# Patient Record
Sex: MALE | Race: WHITE | NOT HISPANIC OR LATINO | Employment: OTHER | URBAN - METROPOLITAN AREA
[De-identification: names, ages, dates, MRNs, and addresses within clinical notes are randomized per-mention and may not be internally consistent; named-entity substitution may affect disease eponyms.]

---

## 2020-01-01 ENCOUNTER — LAB (OUTPATIENT)
Dept: ONCOLOGY | Facility: HOSPITAL | Age: 85
End: 2020-01-01

## 2020-01-01 ENCOUNTER — CONSULT (OUTPATIENT)
Dept: ONCOLOGY | Facility: CLINIC | Age: 85
End: 2020-01-01

## 2020-01-01 ENCOUNTER — SPECIALTY PHARMACY (OUTPATIENT)
Dept: ONCOLOGY | Facility: HOSPITAL | Age: 85
End: 2020-01-01

## 2020-01-01 ENCOUNTER — TELEPHONE (OUTPATIENT)
Dept: GENETICS | Facility: HOSPITAL | Age: 85
End: 2020-01-01

## 2020-01-01 ENCOUNTER — HOSPITAL ENCOUNTER (OUTPATIENT)
Dept: ONCOLOGY | Facility: HOSPITAL | Age: 85
Discharge: HOME | End: 2020-12-09

## 2020-01-01 ENCOUNTER — TELEPHONE (OUTPATIENT)
Dept: ONCOLOGY | Facility: CLINIC | Age: 85
End: 2020-01-01

## 2020-01-01 ENCOUNTER — INFUSION (OUTPATIENT)
Dept: ONCOLOGY | Facility: HOSPITAL | Age: 85
End: 2020-01-01

## 2020-01-01 VITALS
WEIGHT: 169 LBS | SYSTOLIC BLOOD PRESSURE: 162 MMHG | HEART RATE: 67 BPM | RESPIRATION RATE: 20 BRPM | DIASTOLIC BLOOD PRESSURE: 74 MMHG | TEMPERATURE: 98.2 F | BODY MASS INDEX: 25.61 KG/M2 | HEIGHT: 68 IN

## 2020-01-01 VITALS
TEMPERATURE: 97.6 F | WEIGHT: 163.7 LBS | BODY MASS INDEX: 24.89 KG/M2 | DIASTOLIC BLOOD PRESSURE: 51 MMHG | HEART RATE: 62 BPM | SYSTOLIC BLOOD PRESSURE: 147 MMHG | RESPIRATION RATE: 18 BRPM

## 2020-01-01 VITALS
BODY MASS INDEX: 24.69 KG/M2 | TEMPERATURE: 97.6 F | WEIGHT: 162.4 LBS | DIASTOLIC BLOOD PRESSURE: 62 MMHG | SYSTOLIC BLOOD PRESSURE: 149 MMHG | HEART RATE: 68 BPM | RESPIRATION RATE: 18 BRPM

## 2020-01-01 VITALS
HEART RATE: 70 BPM | DIASTOLIC BLOOD PRESSURE: 59 MMHG | BODY MASS INDEX: 24.97 KG/M2 | WEIGHT: 164.2 LBS | SYSTOLIC BLOOD PRESSURE: 162 MMHG | TEMPERATURE: 97.8 F

## 2020-01-01 DIAGNOSIS — C61 PROSTATE CANCER (HCC): Primary | ICD-10-CM

## 2020-01-01 DIAGNOSIS — C61 PROSTATE CANCER (HCC): ICD-10-CM

## 2020-01-01 LAB
ALBUMIN SERPL-MCNC: 4.3 G/DL (ref 3.5–5.2)
ALBUMIN SERPL-MCNC: 4.3 G/DL (ref 3.5–5.2)
ALBUMIN SERPL-MCNC: 4.7 G/DL (ref 3.6–4.6)
ALBUMIN/GLOB SERPL: 1.9 G/DL
ALBUMIN/GLOB SERPL: 1.9 G/DL
ALBUMIN/GLOB SERPL: 2.2 {RATIO} (ref 1.2–2.2)
ALP SERPL-CCNC: 80 U/L (ref 39–117)
ALP SERPL-CCNC: 87 IU/L (ref 39–117)
ALP SERPL-CCNC: 94 U/L (ref 39–117)
ALT SERPL-CCNC: 18 IU/L (ref 0–44)
ALT SERPL-CCNC: 19 U/L (ref 1–41)
ALT SERPL-CCNC: 24 U/L (ref 1–41)
AST SERPL-CCNC: 18 U/L (ref 1–40)
AST SERPL-CCNC: 19 IU/L (ref 0–40)
AST SERPL-CCNC: 19 U/L (ref 1–40)
BASOPHILS # BLD AUTO: 0.07 10*3/MM3 (ref 0–0.2)
BASOPHILS # BLD AUTO: 0.07 10*3/MM3 (ref 0–0.2)
BASOPHILS # BLD AUTO: 0.1 X10E3/UL (ref 0–0.2)
BASOPHILS NFR BLD AUTO: 0.5 % (ref 0–1.5)
BASOPHILS NFR BLD AUTO: 0.6 % (ref 0–1.5)
BASOPHILS NFR BLD AUTO: 1 %
BILIRUB SERPL-MCNC: 0.6 MG/DL (ref 0–1.2)
BILIRUB SERPL-MCNC: 0.7 MG/DL (ref 0–1.2)
BILIRUB SERPL-MCNC: 0.9 MG/DL (ref 0–1.2)
BUN SERPL-MCNC: 21 MG/DL (ref 8–27)
BUN SERPL-MCNC: 26 MG/DL (ref 8–23)
BUN SERPL-MCNC: 27 MG/DL (ref 8–23)
BUN/CREAT SERPL: 19 (ref 10–24)
BUN/CREAT SERPL: 20.8 (ref 7–25)
BUN/CREAT SERPL: 25.7 (ref 7–25)
CALCIUM SERPL-MCNC: 8.5 MG/DL (ref 8.6–10.5)
CALCIUM SERPL-MCNC: 9.2 MG/DL (ref 8.6–10.5)
CALCIUM SERPL-MCNC: 9.8 MG/DL (ref 8.6–10.2)
CHLORIDE SERPL-SCNC: 101 MMOL/L (ref 98–107)
CHLORIDE SERPL-SCNC: 104 MMOL/L (ref 96–106)
CHLORIDE SERPL-SCNC: 104 MMOL/L (ref 98–107)
CO2 SERPL-SCNC: 23 MMOL/L (ref 20–29)
CO2 SERPL-SCNC: 23.2 MMOL/L (ref 22–29)
CO2 SERPL-SCNC: 28.1 MMOL/L (ref 22–29)
CREAT SERPL-MCNC: 1.05 MG/DL (ref 0.76–1.27)
CREAT SERPL-MCNC: 1.13 MG/DL (ref 0.76–1.27)
CREAT SERPL-MCNC: 1.25 MG/DL (ref 0.76–1.27)
EOSINOPHIL # BLD AUTO: 0.11 10*3/MM3 (ref 0–0.4)
EOSINOPHIL # BLD AUTO: 0.11 10*3/MM3 (ref 0–0.4)
EOSINOPHIL # BLD AUTO: 0.2 X10E3/UL (ref 0–0.4)
EOSINOPHIL NFR BLD AUTO: 0.8 % (ref 0.3–6.2)
EOSINOPHIL NFR BLD AUTO: 0.9 % (ref 0.3–6.2)
EOSINOPHIL NFR BLD AUTO: 3 %
ERYTHROCYTE [DISTWIDTH] IN BLOOD BY AUTOMATED COUNT: 11.7 % (ref 12.3–15.4)
ERYTHROCYTE [DISTWIDTH] IN BLOOD BY AUTOMATED COUNT: 12 % (ref 11.6–15.4)
ERYTHROCYTE [DISTWIDTH] IN BLOOD BY AUTOMATED COUNT: 12.8 % (ref 12.3–15.4)
GLOBULIN SER CALC-MCNC: 2.1 G/DL (ref 1.5–4.5)
GLOBULIN SER CALC-MCNC: 2.3 GM/DL
GLOBULIN SER CALC-MCNC: 2.3 GM/DL
GLUCOSE SERPL-MCNC: 113 MG/DL (ref 65–99)
GLUCOSE SERPL-MCNC: 64 MG/DL (ref 65–99)
GLUCOSE SERPL-MCNC: 77 MG/DL (ref 65–99)
HCT VFR BLD AUTO: 37.8 % (ref 37.5–51)
HCT VFR BLD AUTO: 38.4 % (ref 37.5–51)
HCT VFR BLD AUTO: 39 % (ref 37.5–51)
HGB BLD-MCNC: 12.7 G/DL (ref 13–17.7)
HGB BLD-MCNC: 12.8 G/DL (ref 13–17.7)
HGB BLD-MCNC: 12.9 G/DL (ref 13–17.7)
IMM GRANULOCYTES # BLD AUTO: 0 X10E3/UL (ref 0–0.1)
IMM GRANULOCYTES # BLD AUTO: 0.02 10*3/MM3 (ref 0–0.05)
IMM GRANULOCYTES # BLD AUTO: 0.09 10*3/MM3 (ref 0–0.05)
IMM GRANULOCYTES NFR BLD AUTO: 0 %
IMM GRANULOCYTES NFR BLD AUTO: 0.2 % (ref 0–0.5)
IMM GRANULOCYTES NFR BLD AUTO: 0.7 % (ref 0–0.5)
LYMPHOCYTES # BLD AUTO: 1.67 10*3/MM3 (ref 0.7–3.1)
LYMPHOCYTES # BLD AUTO: 1.8 X10E3/UL (ref 0.7–3.1)
LYMPHOCYTES # BLD AUTO: 3.04 10*3/MM3 (ref 0.7–3.1)
LYMPHOCYTES NFR BLD AUTO: 12.3 % (ref 19.6–45.3)
LYMPHOCYTES NFR BLD AUTO: 25 %
LYMPHOCYTES NFR BLD AUTO: 25.1 % (ref 19.6–45.3)
MAGNESIUM SERPL-MCNC: 2.1 MG/DL (ref 1.6–2.4)
MCH RBC QN AUTO: 30 PG (ref 26.6–33)
MCH RBC QN AUTO: 30.8 PG (ref 26.6–33)
MCH RBC QN AUTO: 31 PG (ref 26.6–33)
MCHC RBC AUTO-ENTMCNC: 32.6 G/DL (ref 31.5–35.7)
MCHC RBC AUTO-ENTMCNC: 33.3 G/DL (ref 31.5–35.7)
MCHC RBC AUTO-ENTMCNC: 34.1 G/DL (ref 31.5–35.7)
MCV RBC AUTO: 89.9 FL (ref 79–97)
MCV RBC AUTO: 91 FL (ref 79–97)
MCV RBC AUTO: 94.7 FL (ref 79–97)
MONOCYTES # BLD AUTO: 0.7 X10E3/UL (ref 0.1–0.9)
MONOCYTES # BLD AUTO: 0.95 10*3/MM3 (ref 0.1–0.9)
MONOCYTES # BLD AUTO: 1.14 10*3/MM3 (ref 0.1–0.9)
MONOCYTES NFR BLD AUTO: 7.9 % (ref 5–12)
MONOCYTES NFR BLD AUTO: 8.4 % (ref 5–12)
MONOCYTES NFR BLD AUTO: 9 %
NEUTROPHILS # BLD AUTO: 10.54 10*3/MM3 (ref 1.7–7)
NEUTROPHILS # BLD AUTO: 4.2 X10E3/UL (ref 1.4–7)
NEUTROPHILS # BLD AUTO: 7.91 10*3/MM3 (ref 1.7–7)
NEUTROPHILS NFR BLD AUTO: 62 %
NEUTROPHILS NFR BLD AUTO: 65.3 % (ref 42.7–76)
NEUTROPHILS NFR BLD AUTO: 77.3 % (ref 42.7–76)
NRBC BLD AUTO-RTO: 0 /100 WBC (ref 0–0.2)
NRBC BLD AUTO-RTO: 0 /100 WBC (ref 0–0.2)
PHOSPHATE SERPL-MCNC: 2.9 MG/DL (ref 2.5–4.5)
PLATELET # BLD AUTO: 221 X10E3/UL (ref 150–450)
PLATELET # BLD AUTO: 238 10*3/MM3 (ref 140–450)
PLATELET # BLD AUTO: 266 10*3/MM3 (ref 140–450)
POTASSIUM SERPL-SCNC: 3.5 MMOL/L (ref 3.5–5.2)
POTASSIUM SERPL-SCNC: 3.7 MMOL/L (ref 3.5–5.2)
POTASSIUM SERPL-SCNC: 4.2 MMOL/L (ref 3.5–5.2)
PROT SERPL-MCNC: 6.6 G/DL (ref 6–8.5)
PROT SERPL-MCNC: 6.6 G/DL (ref 6–8.5)
PROT SERPL-MCNC: 6.8 G/DL (ref 6–8.5)
PSA SERPL-MCNC: 4.9 NG/ML (ref 0–4)
RBC # BLD AUTO: 4.12 10*6/MM3 (ref 4.14–5.8)
RBC # BLD AUTO: 4.16 X10E6/UL (ref 4.14–5.8)
RBC # BLD AUTO: 4.27 10*6/MM3 (ref 4.14–5.8)
SODIUM SERPL-SCNC: 140 MMOL/L (ref 136–145)
SODIUM SERPL-SCNC: 140 MMOL/L (ref 136–145)
SODIUM SERPL-SCNC: 142 MMOL/L (ref 134–144)
WBC # BLD AUTO: 12.1 10*3/MM3 (ref 3.4–10.8)
WBC # BLD AUTO: 13.62 10*3/MM3 (ref 3.4–10.8)
WBC # BLD AUTO: 7 X10E3/UL (ref 3.4–10.8)

## 2020-01-01 PROCEDURE — 99204 OFFICE O/P NEW MOD 45 MIN: CPT | Performed by: INTERNAL MEDICINE

## 2020-01-01 PROCEDURE — 36415 COLL VENOUS BLD VENIPUNCTURE: CPT

## 2020-01-01 PROCEDURE — 25010000002 ZOLEDRONIC ACID PER 1 MG: Performed by: INTERNAL MEDICINE

## 2020-01-01 PROCEDURE — 96374 THER/PROPH/DIAG INJ IV PUSH: CPT

## 2020-01-01 PROCEDURE — 96365 THER/PROPH/DIAG IV INF INIT: CPT

## 2020-01-01 RX ORDER — LANSOPRAZOLE 15 MG/1
15 CAPSULE, DELAYED RELEASE ORAL DAILY
COMMUNITY

## 2020-01-01 RX ORDER — IRBESARTAN 300 MG/1
TABLET ORAL
COMMUNITY

## 2020-01-01 RX ORDER — BICALUTAMIDE 50 MG/1
TABLET, FILM COATED ORAL
COMMUNITY
Start: 2020-01-01 | End: 2021-01-01 | Stop reason: DRUGHIGH

## 2020-01-01 RX ORDER — EZETIMIBE 10 MG/1
TABLET ORAL
COMMUNITY
Start: 2013-07-15

## 2020-01-01 RX ORDER — CLOPIDOGREL BISULFATE 75 MG/1
TABLET ORAL
COMMUNITY
Start: 2013-07-15

## 2020-01-01 RX ORDER — PANTOPRAZOLE SODIUM 40 MG/1
TABLET, DELAYED RELEASE ORAL
COMMUNITY
Start: 2013-07-15 | End: 2020-01-01

## 2020-01-01 RX ORDER — SODIUM CHLORIDE 9 MG/ML
250 INJECTION, SOLUTION INTRAVENOUS ONCE
Status: CANCELLED | OUTPATIENT
Start: 2020-01-01

## 2020-01-01 RX ORDER — ONDANSETRON HYDROCHLORIDE 8 MG/1
8 TABLET, FILM COATED ORAL 3 TIMES DAILY PRN
Qty: 30 TABLET | Refills: 5 | Status: SHIPPED | OUTPATIENT
Start: 2020-01-01

## 2020-01-01 RX ORDER — PREDNISONE 1 MG/1
5 TABLET ORAL 2 TIMES DAILY
Qty: 60 TABLET | Refills: 11 | Status: SHIPPED | OUTPATIENT
Start: 2020-01-01

## 2020-01-01 RX ORDER — ASPIRIN 81 MG/1
TABLET ORAL
COMMUNITY

## 2020-01-01 RX ORDER — AMLODIPINE BESYLATE 5 MG/1
TABLET ORAL
COMMUNITY
Start: 2020-01-01 | End: 2021-01-01 | Stop reason: DRUGHIGH

## 2020-01-01 RX ORDER — DIPHENHYDRAMINE HCL 25 MG
25 TABLET ORAL EVERY 6 HOURS PRN
COMMUNITY

## 2020-01-01 RX ORDER — ATORVASTATIN CALCIUM 80 MG/1
TABLET, FILM COATED ORAL
COMMUNITY
Start: 2013-07-15

## 2020-01-01 RX ORDER — IBUPROFEN 200 MG
TABLET ORAL
COMMUNITY

## 2020-01-01 RX ORDER — ABIRATERONE ACETATE 250 MG/1
1000 TABLET ORAL DAILY
Qty: 120 TABLET | Refills: 3 | Status: SHIPPED | OUTPATIENT
Start: 2020-01-01 | End: 2021-01-01 | Stop reason: SDUPTHER

## 2020-01-01 RX ORDER — SODIUM CHLORIDE 9 MG/ML
250 INJECTION, SOLUTION INTRAVENOUS ONCE
Status: COMPLETED | OUTPATIENT
Start: 2020-01-01 | End: 2020-01-01

## 2020-01-01 RX ADMIN — ZOLEDRONIC ACID 4 MG: 4 INJECTION, SOLUTION, CONCENTRATE INTRAVENOUS at 09:25

## 2020-01-01 RX ADMIN — SODIUM CHLORIDE 250 ML: 9 INJECTION, SOLUTION INTRAVENOUS at 09:25

## 2020-12-01 PROBLEM — C61 PROSTATE CANCER (HCC): Status: ACTIVE | Noted: 2020-01-01

## 2020-12-01 NOTE — PROGRESS NOTES
Oral Chemotherapy Teaching      Patient Name/:  Toni Gorman   1935  Oral Chemotherapy Regimen: Abiraterone 1000mg PO daily + Prednisone 5mg PO BID + Lupron + Zometa  Date Started Medication: Cycle 1: as soon as possible    Additional Notes:  Oral chemotherapy clinic received referral from Dr. Ramey regarding the initiation of abiraterone. Reviewed patient chart. Released script for abiraterone 1000mg PO daily, #120 with 3 RF to [facility] to begin processing. Pt scheduled for oral chemotherapy education and financial navigation appt on 20.  Will obtain consent and CCA at that time.

## 2020-12-01 NOTE — PROGRESS NOTES
CHIEF COMPLAINT: Stage IVb prostate cancer    REASON FOR REFERRAL: Metastatic prostate cancer      RECORDS OBTAINED  Records of the patients history including those obtained from Dr. Bass were reviewed and summarized in detail.    Oncology/Hematology History Overview Note   1.  Stage IVb prostate cancer [place] 9 adenocarcinoma with positive bone scan 2014 on Lupron with undetectable PSA.  With urge incontinence, no improvement with Vesicare Myrbetriq, both Flomax.    2.  Abdominal aortic aneurysm endovascular surgery 2009 Dr. Hong  3.  Coronary artery disease with history of CABG 2001 and stent placement  4.  History of cholecystectomy and appendectomy  5.  Hypertension  6.  Urge incontinence with nocturia without improvement on Flomax, Ditropan, or Myrbetriq      -1/6/2014 TRUS core needle biopsies showed adenocarcinoma right lobe of the prostate [place]arel[place] 5+4 with perineural invasion, 3 out of 6 cores involved with adenocarcinoma poorly differentiated.  Left lobe without malignancy.  PSA 41.7.  Clinical stage T2b.  Not considered to be surgical candidate.  CT and bone scan obtained by Dr. Bass.  Started on Casodex with Lupron to follow to avoid surgery and subsequent Lupron.  -11/12/2020 total body bone scan compared to 1/13/2014 bone scan shows uptake in the thoracic spine medial right clavicle similar to prior studies.  Previously identified right proximal humerus and left 10th rib abnormalities resolved.  CT abdomen pelvis with and without contrast compared to CT 6/23/2015 showed the lung bases clear.  Liver normal.  Scattered coarse granulomas of the spleen.  Kidney stone 5 mm distal right ureter with no hydronephrosis.  Evidence of endovascular aortic repair.  Focal aneurysm right common iliac 3 cm slightly increased compared to 2015.  No clear-cut suspicious metastases on bone windows or of nodes for solid organs.  -PSA went from undetectable to 0.04, 0.17 as of 4/16/2019, then 0.32 as of  10/7/2019 then 0.89 as of 3/24/2020 then 2.26 as of 9/15/2020 with a less than 6-month doubling time.  Per Dr. Bass note, he had bone metastases on bone scan at diagnosis when Lupron was started.  Placed on Casodex in addition daily continuation of Lupron yet with a rise in PSA to 9.96 as of 10/29/2020.  -12/1/2020 initial [facility] medical oncology consultation: With rising PSA, evidence of metastasis on bone scan albeit improved on long interval bone scan compared to prior bone scan 6 years apart and no visceral or kendra metastases, with a PSA over 9 I think it is reasonable with stage IVb disease to treat him, in addition to the Lupron with Dr. Bass, to discontinue Casodex and to give Zytiga with prednisone.  He does not meet criteria for chemotherapeutic intervention given the relatively few bone metastases visible and lack of kendra or visceral involvement.  All patients with high-grade metastatic prostate cancer need genetic testing not just for their sake but for guidance of the family per NCCN guidelines and occasionally, genetic drivers for malignancy can be targets for subsequent treatment.  We could also give him Zometa every 3 months which may reduce the risk of fractures etc. from bone involvement as well as mitigate against osteoporotic fractures with chemical castration.     Prostate cancer (CMS/HCC)   12/1/2020 Initial Diagnosis    Prostate cancer (CMS/HCC)     12/1/2020 Cancer Staged    Staging form: Prostate, AJCC 8th Edition  - Clinical: Stage IVB (cT2b, cN0, cM1b, PSA: 41.7, Grade Group: 5) - Signed by Terrenec Ramey MD on 12/1/2020         HISTORY OF PRESENT ILLNESS:  The patient is a 85 y.o.  male, referred for prostate cancer metastatic to bone.  See above for oncology history    REVIEW OF SYSTEMS:  A 14 point review of systems was performed and is negative except as noted above.    Past Medical History:   Diagnosis Date   • Abdominal aneurysm (CMS/HCC)    • Heart disease    • Heart  murmur    • Hypertension      Past Surgical History:   Procedure Laterality Date   • ABDOMINAL AORTIC ANEURYSM REPAIR  2009   • CHOLECYSTECTOMY     • CORONARY ANGIOPLASTY WITH STENT PLACEMENT  2012   • PROSTATE BIOPSY  2014       Current Outpatient Medications on File Prior to Visit   Medication Sig Dispense Refill   • atorvastatin (LIPITOR) 80 MG tablet atorvastatin 80 mg tablet   Take 1 tablet every day by oral route.     • clopidogrel (Plavix) 75 MG tablet Plavix 75 mg tablet   Take 1 tablet every day by oral route.     • diphenhydrAMINE (BENADRYL) 25 MG tablet Take 25 mg by mouth Every 6 (Six) Hours As Needed for Itching.     • ezetimibe (ZETIA) 10 MG tablet ezetimibe 10 mg tablet   Take 1 tablet every day by oral route at bedtime.     • lansoprazole (PREVACID) 15 MG capsule Take 15 mg by mouth Daily.     • [DISCONTINUED] pantoprazole (PROTONIX) 40 MG EC tablet pantoprazole 40 mg tablet,delayed release   Take 1 tablet every day by oral route.     • amLODIPine (NORVASC) 5 MG tablet      • aspirin (aspirin) 81 MG EC tablet Aspir-81     • bicalutamide (CASODEX) 50 MG chemo tablet      • ibuprofen (ADVIL,MOTRIN) 200 MG tablet ibuprofen 200 mg tablet   Take 1 tablet as needed by oral route.     • irbesartan (AVAPRO) 300 MG tablet irbesartan 300 mg tablet   Take 1 tablet every day by oral route.       No current facility-administered medications on file prior to visit.        No Known Allergies    Social History     Socioeconomic History   • Marital status:      Spouse name: Not on file   • Number of children: Not on file   • Years of education: Not on file   • Highest education level: Not on file   Tobacco Use   • Smoking status: Former Smoker     Packs/day: 0.25     Years: 3.00     Pack years: 0.75     Types: Cigarettes   Substance and Sexual Activity   • Alcohol use: Yes     Alcohol/week: 1.0 standard drinks     Types: 1 Shots of liquor per week   • Drug use: Never       Family History   Problem Relation Age  "of Onset   • Heart disease Mother    • Heart disease Father        PHYSICAL EXAM:    /74   Pulse 67   Temp 98.2 °F (36.8 °C)   Resp 20   Ht 172.7 cm (68\")   Wt 76.7 kg (169 lb)   BMI 25.70 kg/m²     ECOG: (1) Restricted in Physically Strenuous Activity, Ambulatory & Able to Do Work of Light Nature  General: well appearing male in no acute distress  HEENT: sclera anicteric, oropharynx clear  Lymphatics: no cervical, supraclavicular, inguinal, or axillary adenopathy  Cardiovascular: regular rate and rhythm, no murmurs  Neck: Supple; No thyromegaly  Lungs: clear to auscultation bilaterally. No respiratory distress.   Abdomen: soft, nontender, nondistended.  No palpable organomegaly  Extremities: no cyanosis, clubbing, edema, or cords  Skin: no rashes, lesions, bruising, or petechiae  Neuro: Alert and oriented x 4; Moving all extremities.  Psych: No anxiety or depression    No results found for: HGB, HCT, MCV, PLT, WBC, NEUTROABS, LYMPHSABS, MONOSABS, EOSABS, BASOSABS  No results found for: GLUCOSE, BUN, CREATININE, NA, K, CL, CO2, CALCIUM, PROTEINTOT, ALBUMIN, BILITOT, ALKPHOS, AST, ALT        Assessment/Plan     1.  Stage IVb prostate cancer [place] 9 adenocarcinoma with positive bone scan 2014 on Lupron with undetectable PSA.  With urge incontinence, no improvement with Vesicare Myrbetriq, both Flomax.    2.  Abdominal aortic aneurysm endovascular surgery 2009 Dr. Hong  3.  Coronary artery disease with history of CABG 2001 and stent placement  4.  History of cholecystectomy and appendectomy  5.  Hypertension  6.  Urge incontinence with nocturia without improvement on Flomax, Ditropan, or Myrbetriq      -1/6/2014 TRUS core needle biopsies showed adenocarcinoma right lobe of the prostate Karel's 5+4 with perineural invasion, 3 out of 6 cores involved with adenocarcinoma poorly differentiated.  Left lobe without malignancy.  PSA 41.7.  Clinical stage T2b.  Not considered to be surgical candidate.  CT " and bone scan obtained by Dr. Bass.  Started on Casodex with Lupron to follow to avoid surgery and subsequent Lupron.  -11/12/2020 total body bone scan compared to 1/13/2014 bone scan shows uptake in the thoracic spine medial right clavicle similar to prior studies.  Previously identified right proximal humerus and left 10th rib abnormalities resolved.  CT abdomen pelvis with and without contrast compared to CT 6/23/2015 showed the lung bases clear.  Liver normal.  Scattered coarse granulomas of the spleen.  Kidney stone 5 mm distal right ureter with no hydronephrosis.  Evidence of endovascular aortic repair.  Focal aneurysm right common iliac 3 cm slightly increased compared to 2015.  No clear-cut suspicious metastases on bone windows or of nodes for solid organs.  -PSA went from undetectable to 0.04, 0.17 as of 4/16/2019, then 0.32 as of 10/7/2019 then 0.89 as of 3/24/2020 then 2.26 as of 9/15/2020 with a less than 6-month doubling time.  Per Dr. Bass note, he had bone metastases on bone scan at diagnosis when Lupron was started.  Placed on Casodex in addition daily continuation of Lupron yet with a rise in PSA to 9.96 as of 10/29/2020.  -12/1/2020 initial [facility] medical oncology consultation: With rising PSA, evidence of metastasis on bone scan albeit improved on long interval bone scan compared to prior bone scan 6 years apart and no visceral or kendra metastases, with a PSA over 9 I think it is reasonable with stage IVb disease to treat him, in addition to the Lupron with Dr. Bass, to discontinue Casodex and to give Zytiga with prednisone.  He does not meet criteria for chemotherapeutic intervention given the relatively few bone metastases visible and lack of kendra or visceral involvement.  All patients with high-grade metastatic prostate cancer need genetic testing not just for their sake but for guidance of the family per NCCN guidelines and occasionally, genetic drivers for malignancy can be  targets for subsequent treatment.  We could also give him Zometa every 3 months which may reduce the risk of fractures etc. from bone involvement as well as mitigate against osteoporotic fractures with chemical castration.  He has no dental issues that he is aware of that would put him at an increased risk for osteonecrosis of the mandible.  He will get chemo preparation visit with my pharmacy doctorate, Gillian Vora and we will stop the Casodex once the Zytiga and prednisone are started.  He will also get educated on the Zometa will start that on a couple of weeks and do that every 3 months.  I will have him back to my nurse practitioner in a month for labs and to make sure he is tolerating.  Discussed with patient face-to-face 45 minutes greater than 50% spent counseling regarding this plan as outlined above.      Terrence Ramey MD    12/1/2020

## 2020-12-09 NOTE — PLAN OF CARE
"Oral Chemotherapy Teaching      Patient Name/:  Toni Gorman   1935  Oral Chemotherapy Regimen: Abiraterone 250mg tablets: 4 tablets PO daily + Prednisone 5mg tablets: 1 tablet PO BID   Date Started Medication: Planned: 12/10/20     Initial Teaching Follow Up Comments     Safety     Storage instructions (away from children; away from heat/cold, sunlight, or moisture), handling - use of gloves (caregivers), washing hands after touching pills, managing waste     “How are you storing your medications?”, reminders on storage, proper handling (caregivers using gloves, washing hands, away from children, managing waste, etc.), disposal of medication with D/C or dosage change    Patient counseled on hazardous handling and storage precautions. Discussed plan to wash hands after handling. Caregivers to handle with latex gloves. Reviewed safe sex practices. Discussed appropriate management of bodily fluids. Store at room temp, away from pets and children. Pt verbalized understanding.     Adherence      patient and/or caregiver on how to take medication, take with/without food, assess their adherence potential, stress importance of adherence, ways to manage adherence (pill boxes, phone reminders, calendars), what to do if miss a dose   “How are you taking your medication?” “How are you remembering to take your medication?”, “How many doses have you missed?”, determine reasons for non-adherence (not remembering, side effects, etc), ways to improve, overadherence? Remind patient of ways to improve/maintain adherence   Informed patient to take Abiraterone 250mg tablet: 4 tablets PO daily on an empty stomach (2 hours prior to a meal or 1 hour after). After discussion with the patient and his wife it was decided that bedtime would be the best time to take the medication. The patient uses a pill box and does not have any medications in his \"bedtime\" row. Informed patient that there is no issue with putting the " abiraterone into the pill box as long as it was  from his other medications. Patient states that they stay very adherent to medications with the pill box. Informed patient to take prednisone 5mg tablets: 1 tablet PO BID with food. After discussion patient will take the prednisone in the morning with breakfast and in the evening with dinner. Patient informed about how to handle a missed dose and states that they are very good about taking their medications.     Side Effects/Adverse Reactions      patient on potential side effects, s/s, ways to manage, when to call MD/seek help     Determine if patient experiencing side effects, ways to manage  Discussed the role of RBCs, risk of anemia, and s/sx of anemia (including fatigue). Encouraged patient to continue usual physical activity as tolerated.  Discussed the role of platelets and increased risk of bleeding/bruising. Educated patient on ways to prevent/stop bleeding and when to call MD.  Discussed the role of WBCs and increased risk of infection. Instructed patient to notify MD immediately for temperatures >/= 100.4F. Encouraged appropriate hand hygiene and avoidance of sick contacts.  Discussed risk of diarrhea. Reviewed OTC use of loperamide and when to call MD.  Discussed risk of constipation. Reviewed OTC use of docusate, PEG, and senna and when to call MD.   Reviewed the risk of N/V and antiemetics in current regimen (ondansetron as needed). Instructed patient to call if N/V is not controlled.  Discussed the risk of organ toxicities, specifically low blood counts, elevations in LFTs. Notified patient we will be monitoring toxicities via routine labs. Discussed the potential for muscle and joint pain and that they can use APAP as needed to relieve symptoms.      Miscellaneous     Food interactions, DDIs, financial issues Determine if patient started any new medications since being placed on oral chemo (analyze for DDI) No DDI's to discuss. Informed  patient to avoid grapefruit juice while on this therapy and they state that this will not be an issue. Patient was instructed to stop taking Casodex today and begin taking abiraterone tomorrow. Patient stated understanding and wrote down to confirm they remembered. Reinforced to patient that they should not take Casodex and abiraterone at the same time. Patient seems very informed and interested in their therapy.     Additional Notes:  Discussed above material with patient in the education room. All questions and concerns were addressed. Patient was provided with a personalized calendar, written educational materials, and Gillian Vora's card. Instructed patient to call should they have any additional questions

## 2020-12-22 NOTE — PATIENT INSTRUCTIONS
Zoledronic Acid injection (Hypercalcemia, Oncology)  What is this medicine?  ZOLEDRONIC ACID (DENIA le zachn ik AS id) lowers the amount of calcium loss from bone. It is used to treat too much calcium in your blood from cancer. It is also used to prevent complications of cancer that has spread to the bone.  This medicine may be used for other purposes; ask your health care provider or pharmacist if you have questions.  COMMON BRAND NAME(S): Zometa  What should I tell my health care provider before I take this medicine?  They need to know if you have any of these conditions:  · aspirin-sensitive asthma  · cancer, especially if you are receiving medicines used to treat cancer  · dental disease or wear dentures  · infection  · kidney disease  · receiving corticosteroids like dexamethasone or prednisone  · an unusual or allergic reaction to zoledronic acid, other medicines, foods, dyes, or preservatives  · pregnant or trying to get pregnant  · breast-feeding  How should I use this medicine?  This medicine is for infusion into a vein. It is given by a health care professional in a hospital or clinic setting.  Talk to your pediatrician regarding the use of this medicine in children. Special care may be needed.  Overdosage: If you think you have taken too much of this medicine contact a poison control center or emergency room at once.  NOTE: This medicine is only for you. Do not share this medicine with others.  What if I miss a dose?  It is important not to miss your dose. Call your doctor or health care professional if you are unable to keep an appointment.  What may interact with this medicine?  · certain antibiotics given by injection  · NSAIDs, medicines for pain and inflammation, like ibuprofen or naproxen  · some diuretics like bumetanide, furosemide  · teriparatide  · thalidomide  This list may not describe all possible interactions. Give your health care provider a list of all the medicines, herbs, non-prescription  drugs, or dietary supplements you use. Also tell them if you smoke, drink alcohol, or use illegal drugs. Some items may interact with your medicine.  What should I watch for while using this medicine?  Visit your doctor or health care professional for regular checkups. It may be some time before you see the benefit from this medicine. Do not stop taking your medicine unless your doctor tells you to. Your doctor may order blood tests or other tests to see how you are doing.  Women should inform their doctor if they wish to become pregnant or think they might be pregnant. There is a potential for serious side effects to an unborn child. Talk to your health care professional or pharmacist for more information.  You should make sure that you get enough calcium and vitamin D while you are taking this medicine. Discuss the foods you eat and the vitamins you take with your health care professional.  Some people who take this medicine have severe bone, joint, and/or muscle pain. This medicine may also increase your risk for jaw problems or a broken thigh bone. Tell your doctor right away if you have severe pain in your jaw, bones, joints, or muscles. Tell your doctor if you have any pain that does not go away or that gets worse.  Tell your dentist and dental surgeon that you are taking this medicine. You should not have major dental surgery while on this medicine. See your dentist to have a dental exam and fix any dental problems before starting this medicine. Take good care of your teeth while on this medicine. Make sure you see your dentist for regular follow-up appointments.  What side effects may I notice from receiving this medicine?  Side effects that you should report to your doctor or health care professional as soon as possible:  · allergic reactions like skin rash, itching or hives, swelling of the face, lips, or tongue  · anxiety, confusion, or depression  · breathing problems  · changes in vision  · eye  pain  · feeling faint or lightheaded, falls  · jaw pain, especially after dental work  · mouth sores  · muscle cramps, stiffness, or weakness  · redness, blistering, peeling or loosening of the skin, including inside the mouth  · trouble passing urine or change in the amount of urine  Side effects that usually do not require medical attention (report to your doctor or health care professional if they continue or are bothersome):  · bone, joint, or muscle pain  · constipation  · diarrhea  · fever  · hair loss  · irritation at site where injected  · loss of appetite  · nausea, vomiting  · stomach upset  · trouble sleeping  · trouble swallowing  · weak or tired  This list may not describe all possible side effects. Call your doctor for medical advice about side effects. You may report side effects to FDA at 6-021-FDA-6645.  Where should I keep my medicine?  This drug is given in a hospital or clinic and will not be stored at home.  NOTE: This sheet is a summary. It may not cover all possible information. If you have questions about this medicine, talk to your doctor, pharmacist, or health care provider.  © 2020 [employer] (2015-05-16 14:19:39)

## 2020-12-31 NOTE — TELEPHONE ENCOUNTER
PATIENT CALLING TO SPEAK WITH ALEKSANDR ESTRELLA    PATIENT RECEIVED INFO FOR A RICARDO FOR ZOMETA, INFO FROM [facility], ID# [id number], GROUP #[id number], BIN# [id number],  PCN# [id number], PATIENT IS GOING TO RUN OUT OF MEDICATION, NEEDS REFILLS, PT ONLY HAS ENOUGH TO LAST TO 1-7-21, HE IS GOING OUT OF TOWN FROM 1-30-21 TIL 2-13-21 AND 1 REFILL WILL NOT BE ENOUGH, PHARMACY AT THE [facility],PLEASE ADVISE?    PT CALL BACK # [phone]

## 2021-01-01 ENCOUNTER — TELEPHONE (OUTPATIENT)
Dept: ONCOLOGY | Facility: CLINIC | Age: 86
End: 2021-01-01

## 2021-01-01 ENCOUNTER — SPECIALTY PHARMACY (OUTPATIENT)
Dept: ONCOLOGY | Facility: HOSPITAL | Age: 86
End: 2021-01-01

## 2021-01-01 ENCOUNTER — OFFICE VISIT (OUTPATIENT)
Dept: ONCOLOGY | Facility: CLINIC | Age: 86
End: 2021-01-01

## 2021-01-01 ENCOUNTER — OUTSIDE FACILITY SERVICE (OUTPATIENT)
Dept: CARDIOLOGY | Facility: CLINIC | Age: 86
End: 2021-01-01

## 2021-01-01 ENCOUNTER — LAB (OUTPATIENT)
Dept: ONCOLOGY | Facility: HOSPITAL | Age: 86
End: 2021-01-01

## 2021-01-01 ENCOUNTER — INFUSION (OUTPATIENT)
Dept: ONCOLOGY | Facility: HOSPITAL | Age: 86
End: 2021-01-01

## 2021-01-01 ENCOUNTER — TELEMEDICINE (OUTPATIENT)
Dept: ONCOLOGY | Facility: CLINIC | Age: 86
End: 2021-01-01

## 2021-01-01 VITALS
WEIGHT: 167 LBS | BODY MASS INDEX: 25.31 KG/M2 | DIASTOLIC BLOOD PRESSURE: 75 MMHG | RESPIRATION RATE: 20 BRPM | HEART RATE: 75 BPM | HEIGHT: 68 IN | TEMPERATURE: 97.7 F | SYSTOLIC BLOOD PRESSURE: 140 MMHG

## 2021-01-01 VITALS
HEIGHT: 68 IN | HEART RATE: 64 BPM | BODY MASS INDEX: 25.46 KG/M2 | DIASTOLIC BLOOD PRESSURE: 56 MMHG | WEIGHT: 168 LBS | SYSTOLIC BLOOD PRESSURE: 143 MMHG | TEMPERATURE: 97.1 F | RESPIRATION RATE: 18 BRPM

## 2021-01-01 VITALS
TEMPERATURE: 98.8 F | BODY MASS INDEX: 25.3 KG/M2 | HEART RATE: 76 BPM | WEIGHT: 166.4 LBS | RESPIRATION RATE: 18 BRPM | DIASTOLIC BLOOD PRESSURE: 58 MMHG | SYSTOLIC BLOOD PRESSURE: 149 MMHG

## 2021-01-01 VITALS
SYSTOLIC BLOOD PRESSURE: 143 MMHG | WEIGHT: 165 LBS | BODY MASS INDEX: 25.01 KG/M2 | RESPIRATION RATE: 18 BRPM | DIASTOLIC BLOOD PRESSURE: 62 MMHG | HEIGHT: 68 IN | HEART RATE: 64 BPM | TEMPERATURE: 97.9 F

## 2021-01-01 VITALS
TEMPERATURE: 97.7 F | SYSTOLIC BLOOD PRESSURE: 151 MMHG | WEIGHT: 163.2 LBS | RESPIRATION RATE: 18 BRPM | HEART RATE: 83 BPM | BODY MASS INDEX: 24.81 KG/M2 | DIASTOLIC BLOOD PRESSURE: 58 MMHG

## 2021-01-01 DIAGNOSIS — C61 PROSTATE CANCER (HCC): Primary | ICD-10-CM

## 2021-01-01 DIAGNOSIS — C61 PROSTATE CANCER (HCC): ICD-10-CM

## 2021-01-01 DIAGNOSIS — R93.5 ABNORMAL CT OF THE ABDOMEN: ICD-10-CM

## 2021-01-01 DIAGNOSIS — C79.51 BONE METASTASIS: ICD-10-CM

## 2021-01-01 LAB
ALBUMIN SERPL-MCNC: 4.1 G/DL (ref 3.6–4.6)
ALBUMIN SERPL-MCNC: 4.2 G/DL (ref 3.5–5.2)
ALBUMIN SERPL-MCNC: 4.2 G/DL (ref 3.5–5.2)
ALBUMIN/GLOB SERPL: 1.8 {RATIO} (ref 1.2–2.2)
ALBUMIN/GLOB SERPL: 2.1 G/DL
ALBUMIN/GLOB SERPL: 2.5 G/DL
ALP SERPL-CCNC: 60 U/L (ref 39–117)
ALP SERPL-CCNC: 65 U/L (ref 39–117)
ALP SERPL-CCNC: 74 IU/L (ref 39–117)
ALT SERPL-CCNC: 27 U/L (ref 1–41)
ALT SERPL-CCNC: 30 U/L (ref 1–41)
ALT SERPL-CCNC: 38 IU/L (ref 0–44)
AST SERPL-CCNC: 25 U/L (ref 1–40)
AST SERPL-CCNC: 26 U/L (ref 1–40)
AST SERPL-CCNC: 28 IU/L (ref 0–40)
BASOPHILS # BLD AUTO: 0.04 10*3/MM3 (ref 0–0.2)
BASOPHILS # BLD AUTO: 0.06 10*3/MM3 (ref 0–0.2)
BASOPHILS # BLD AUTO: 0.1 X10E3/UL (ref 0–0.2)
BASOPHILS NFR BLD AUTO: 0 %
BASOPHILS NFR BLD AUTO: 0.4 % (ref 0–1.5)
BASOPHILS NFR BLD AUTO: 0.4 % (ref 0–1.5)
BILIRUB SERPL-MCNC: 0.7 MG/DL (ref 0–1.2)
BILIRUB SERPL-MCNC: 0.8 MG/DL (ref 0–1.2)
BILIRUB SERPL-MCNC: 1 MG/DL (ref 0–1.2)
BUN SERPL-MCNC: 22 MG/DL (ref 8–23)
BUN SERPL-MCNC: 25 MG/DL (ref 8–27)
BUN SERPL-MCNC: 31 MG/DL (ref 8–23)
BUN/CREAT SERPL: 21 (ref 10–24)
BUN/CREAT SERPL: 23.4 (ref 7–25)
BUN/CREAT SERPL: 23.5 (ref 7–25)
CALCIUM SERPL-MCNC: 8.8 MG/DL (ref 8.6–10.2)
CALCIUM SERPL-MCNC: 9.1 MG/DL (ref 8.6–10.5)
CALCIUM SERPL-MCNC: 9.8 MG/DL (ref 8.6–10.5)
CHLORIDE SERPL-SCNC: 105 MMOL/L (ref 98–107)
CHLORIDE SERPL-SCNC: 106 MMOL/L (ref 96–106)
CHLORIDE SERPL-SCNC: 107 MMOL/L (ref 98–107)
CO2 SERPL-SCNC: 21 MMOL/L (ref 20–29)
CO2 SERPL-SCNC: 22.9 MMOL/L (ref 22–29)
CO2 SERPL-SCNC: 25.6 MMOL/L (ref 22–29)
CREAT SERPL-MCNC: 0.94 MG/DL (ref 0.76–1.27)
CREAT SERPL-MCNC: 1.17 MG/DL (ref 0.76–1.27)
CREAT SERPL-MCNC: 1.32 MG/DL (ref 0.76–1.27)
EOSINOPHIL # BLD AUTO: 0 X10E3/UL (ref 0–0.4)
EOSINOPHIL # BLD AUTO: 0.04 10*3/MM3 (ref 0–0.4)
EOSINOPHIL # BLD AUTO: 0.06 10*3/MM3 (ref 0–0.4)
EOSINOPHIL NFR BLD AUTO: 0 %
EOSINOPHIL NFR BLD AUTO: 0.4 % (ref 0.3–6.2)
EOSINOPHIL NFR BLD AUTO: 0.4 % (ref 0.3–6.2)
ERYTHROCYTE [DISTWIDTH] IN BLOOD BY AUTOMATED COUNT: 12.5 % (ref 12.3–15.4)
ERYTHROCYTE [DISTWIDTH] IN BLOOD BY AUTOMATED COUNT: 13 % (ref 11.6–15.4)
ERYTHROCYTE [DISTWIDTH] IN BLOOD BY AUTOMATED COUNT: 13.3 % (ref 12.3–15.4)
GLOBULIN SER CALC-MCNC: 1.7 GM/DL
GLOBULIN SER CALC-MCNC: 2 GM/DL
GLOBULIN SER CALC-MCNC: 2.3 G/DL (ref 1.5–4.5)
GLUCOSE SERPL-MCNC: 107 MG/DL (ref 65–99)
GLUCOSE SERPL-MCNC: 94 MG/DL (ref 65–99)
GLUCOSE SERPL-MCNC: 95 MG/DL (ref 65–99)
HCT VFR BLD AUTO: 37.4 % (ref 37.5–51)
HCT VFR BLD AUTO: 38.3 % (ref 37.5–51)
HCT VFR BLD AUTO: 39 % (ref 37.5–51)
HGB BLD-MCNC: 12.6 G/DL (ref 13–17.7)
HGB BLD-MCNC: 12.6 G/DL (ref 13–17.7)
HGB BLD-MCNC: 13 G/DL (ref 13–17.7)
IMM GRANULOCYTES # BLD AUTO: 0 X10E3/UL (ref 0–0.1)
IMM GRANULOCYTES # BLD AUTO: 0.04 10*3/MM3 (ref 0–0.05)
IMM GRANULOCYTES # BLD AUTO: 0.08 10*3/MM3 (ref 0–0.05)
IMM GRANULOCYTES NFR BLD AUTO: 0 %
IMM GRANULOCYTES NFR BLD AUTO: 0.4 % (ref 0–0.5)
IMM GRANULOCYTES NFR BLD AUTO: 0.6 % (ref 0–0.5)
LYMPHOCYTES # BLD AUTO: 1.36 10*3/MM3 (ref 0.7–3.1)
LYMPHOCYTES # BLD AUTO: 1.45 10*3/MM3 (ref 0.7–3.1)
LYMPHOCYTES # BLD AUTO: 1.6 X10E3/UL (ref 0.7–3.1)
LYMPHOCYTES NFR BLD AUTO: 10 % (ref 19.6–45.3)
LYMPHOCYTES NFR BLD AUTO: 12 %
LYMPHOCYTES NFR BLD AUTO: 13.6 % (ref 19.6–45.3)
MCH RBC QN AUTO: 30.7 PG (ref 26.6–33)
MCH RBC QN AUTO: 31.2 PG (ref 26.6–33)
MCH RBC QN AUTO: 31.9 PG (ref 26.6–33)
MCHC RBC AUTO-ENTMCNC: 32.9 G/DL (ref 31.5–35.7)
MCHC RBC AUTO-ENTMCNC: 33.3 G/DL (ref 31.5–35.7)
MCHC RBC AUTO-ENTMCNC: 33.7 G/DL (ref 31.5–35.7)
MCV RBC AUTO: 92.6 FL (ref 79–97)
MCV RBC AUTO: 93 FL (ref 79–97)
MCV RBC AUTO: 95.6 FL (ref 79–97)
MONOCYTES # BLD AUTO: 0.72 10*3/MM3 (ref 0.1–0.9)
MONOCYTES # BLD AUTO: 0.78 10*3/MM3 (ref 0.1–0.9)
MONOCYTES # BLD AUTO: 0.9 X10E3/UL (ref 0.1–0.9)
MONOCYTES NFR BLD AUTO: 5.7 % (ref 5–12)
MONOCYTES NFR BLD AUTO: 6.7 % (ref 5–12)
MONOCYTES NFR BLD AUTO: 7 %
NEUTROPHILS # BLD AUTO: 10.8 X10E3/UL (ref 1.4–7)
NEUTROPHILS # BLD AUTO: 11.23 10*3/MM3 (ref 1.7–7)
NEUTROPHILS # BLD AUTO: 8.41 10*3/MM3 (ref 1.7–7)
NEUTROPHILS NFR BLD AUTO: 78.5 % (ref 42.7–76)
NEUTROPHILS NFR BLD AUTO: 81 %
NEUTROPHILS NFR BLD AUTO: 82.9 % (ref 42.7–76)
NRBC BLD AUTO-RTO: 0 /100 WBC (ref 0–0.2)
NRBC BLD AUTO-RTO: 0 /100 WBC (ref 0–0.2)
PLATELET # BLD AUTO: 216 10*3/MM3 (ref 140–450)
PLATELET # BLD AUTO: 254 10*3/MM3 (ref 140–450)
PLATELET # BLD AUTO: 254 X10E3/UL (ref 150–450)
POTASSIUM SERPL-SCNC: 3.9 MMOL/L (ref 3.5–5.2)
POTASSIUM SERPL-SCNC: 4.2 MMOL/L (ref 3.5–5.2)
POTASSIUM SERPL-SCNC: 4.3 MMOL/L (ref 3.5–5.2)
PROT SERPL-MCNC: 5.9 G/DL (ref 6–8.5)
PROT SERPL-MCNC: 6.2 G/DL (ref 6–8.5)
PROT SERPL-MCNC: 6.4 G/DL (ref 6–8.5)
PSA SERPL-MCNC: 7.08 NG/ML (ref 0–4)
PSA SERPL-MCNC: 8.5 NG/ML (ref 0–4)
PSA SERPL-MCNC: 8.66 NG/ML (ref 0–4)
RBC # BLD AUTO: 4.04 10*6/MM3 (ref 4.14–5.8)
RBC # BLD AUTO: 4.08 10*6/MM3 (ref 4.14–5.8)
RBC # BLD AUTO: 4.1 X10E6/UL (ref 4.14–5.8)
SODIUM SERPL-SCNC: 139 MMOL/L (ref 136–145)
SODIUM SERPL-SCNC: 140 MMOL/L (ref 136–145)
SODIUM SERPL-SCNC: 142 MMOL/L (ref 134–144)
WBC # BLD AUTO: 10.7 10*3/MM3 (ref 3.4–10.8)
WBC # BLD AUTO: 13.5 X10E3/UL (ref 3.4–10.8)
WBC # BLD AUTO: 13.57 10*3/MM3 (ref 3.4–10.8)

## 2021-01-01 PROCEDURE — 36415 COLL VENOUS BLD VENIPUNCTURE: CPT

## 2021-01-01 PROCEDURE — 99215 OFFICE O/P EST HI 40 MIN: CPT | Performed by: INTERNAL MEDICINE

## 2021-01-01 PROCEDURE — 99214 OFFICE O/P EST MOD 30 MIN: CPT | Performed by: NURSE PRACTITIONER

## 2021-01-01 PROCEDURE — 25010000002 ZOLEDRONIC ACID PER 1 MG: Performed by: NURSE PRACTITIONER

## 2021-01-01 PROCEDURE — 99232 SBSQ HOSP IP/OBS MODERATE 35: CPT | Performed by: INTERNAL MEDICINE

## 2021-01-01 PROCEDURE — 96372 THER/PROPH/DIAG INJ SC/IM: CPT

## 2021-01-01 PROCEDURE — 96374 THER/PROPH/DIAG INJ IV PUSH: CPT

## 2021-01-01 RX ORDER — CHOLECALCIFEROL (VITAMIN D3) 25 MCG
1000 CAPSULE ORAL DAILY
COMMUNITY

## 2021-01-01 RX ORDER — SODIUM CHLORIDE 9 MG/ML
250 INJECTION, SOLUTION INTRAVENOUS ONCE
Status: CANCELLED | OUTPATIENT
Start: 2021-01-01

## 2021-01-01 RX ORDER — SODIUM CHLORIDE 9 MG/ML
250 INJECTION, SOLUTION INTRAVENOUS ONCE
Status: CANCELLED | OUTPATIENT
Start: 2021-06-08

## 2021-01-01 RX ORDER — AMLODIPINE BESYLATE 10 MG/1
TABLET ORAL
COMMUNITY
Start: 2021-01-01

## 2021-01-01 RX ORDER — ABIRATERONE ACETATE 250 MG/1
1000 TABLET ORAL DAILY
Qty: 120 TABLET | Refills: 3 | Status: SHIPPED | OUTPATIENT
Start: 2021-01-01

## 2021-01-01 RX ADMIN — ZOLEDRONIC ACID 4 MG: 4 INJECTION, SOLUTION, CONCENTRATE INTRAVENOUS at 14:09

## 2021-01-04 NOTE — PLAN OF CARE
Reached out to Mr. Gorman for a refill assessment and toxicity check on his abiraterone and prednisone. Patient states he has been compliant and has not experienced any new side effects in the past month. He last got labs done on 12/29/20. Requesting refill of abiraterone and prednisone; his last dose is on 1/7/21. Patient would like the refill mailed to him - address confirmed in [facility].

## 2021-01-06 NOTE — PLAN OF CARE
Returned Mr. Gorman' inquiry about chemo excreted through his body fluids. He is planning on going to [place] and wanted to know if it was okay for him to be in the hot tub with other people. I stated that it was okay and it would not be harmful to other people. I also clarified that he can share a bed with his wife and share utensils with other people. He uses a different bathroom in the house than his wife and I said our recommendation is to flush the toilet twice after using and to clean up if the toilet seat gets soiled but his wife can use the same restroom if they would like.

## 2021-01-06 NOTE — PROGRESS NOTES
CHIEF COMPLAINT: Prostate cancer    Problem List:  Oncology/Hematology History Overview Note   1.  Stage IVb prostate cancer [place] 9 adenocarcinoma with positive bone scan 2014 on Lupron with undetectable PSA.  With urge incontinence, no improvement with Vesicare Myrbetriq, both Flomax.    2.  Abdominal aortic aneurysm endovascular surgery 2009 Dr. Hong  3.  Coronary artery disease with history of CABG 2001 and stent placement  4.  History of cholecystectomy and appendectomy  5.  Hypertension  6.  Urge incontinence with nocturia without improvement on Flomax, Ditropan, or Myrbetriq      -1/6/2014 TRUS core needle biopsies showed adenocarcinoma right lobe of the prostate Corona's 5+4 with perineural invasion, 3 out of 6 cores involved with adenocarcinoma poorly differentiated.  Left lobe without malignancy.  PSA 41.7.  Clinical stage T2b.  Not considered to be surgical candidate.  CT and bone scan obtained by Dr. Bass.  Started on Casodex with Lupron to follow to avoid surgery and subsequent Lupron.  -11/12/2020 total body bone scan compared to 1/13/2014 bone scan shows uptake in the thoracic spine medial right clavicle similar to prior studies.  Previously identified right proximal humerus and left 10th rib abnormalities resolved.  CT abdomen pelvis with and without contrast compared to CT 6/23/2015 showed the lung bases clear.  Liver normal.  Scattered coarse granulomas of the spleen.  Kidney stone 5 mm distal right ureter with no hydronephrosis.  Evidence of endovascular aortic repair.  Focal aneurysm right common iliac 3 cm slightly increased compared to 2015.  No clear-cut suspicious metastases on bone windows or of nodes for solid organs.  -PSA went from undetectable to 0.04, 0.17 as of 4/16/2019, then 0.32 as of 10/7/2019 then 0.89 as of 3/24/2020 then 2.26 as of 9/15/2020 with a less than 6-month doubling time.  Per Dr. Bass note, he had bone metastases on bone scan at diagnosis when Lupron  was started.  Placed on Casodex in addition daily continuation of Lupron yet with a rise in PSA to 9.96 as of 10/29/2020.  -12/1/2020 initial [facility] consultation: With rising PSA, evidence of metastasis on bone scan albeit improved on long interval bone scan compared to prior bone scan 6 years apart and no visceral or kendra metastases, with a PSA over 9 I think it is reasonable with stage IVb disease to treat him, in addition to the Lupron with Dr. Bass, to discontinue Casodex and to give Zytiga with prednisone.  He does not meet criteria for chemotherapeutic intervention given the relatively few bone metastases visible and lack of kendra or visceral involvement.  All patients with high-grade metastatic prostate cancer need genetic testing not just for their sake but for guidance of the family per NCCN guidelines and occasionally, genetic drivers for malignancy can be targets for subsequent treatment.  We could also give him Zometa every 3 months which may reduce the risk of fractures etc. from bone involvement as well as mitigate against osteoporotic fractures with chemical castration.     Prostate cancer (CMS/[facility])   12/1/2020 Initial Diagnosis    Prostate cancer (CMS/[facility])     12/1/2020 Cancer Staged    Staging form: Prostate, AJCC 8th Edition  - Clinical: Stage IVB (cT2b, cN0, cM1b, PSA: 41.7, Grade Group: 5) - Signed by Terrence Ramey MD on 12/1/2020 12/2/2020 -  Chemotherapy    OP PROSTATE Abiraterone / PredniSONE     12/22/2020 -  Chemotherapy    OP SUPPORTIVE Zoledronic Acid Q12W         HISTORY OF PRESENT ILLNESS:  The patient is a 85 y.o. male, here for follow up on management of stage IVb prostate cancer.  The patient has been on Zytiga and prednisone for about 1 month now.  He has stopped Casodex.  Continues on Lupron with Dr. Bass, has a follow-up next week.  He reports feeling well, has had no significant side effects that he is aware of from new medications.  He has had a  "few days with intermittent diarrhea.  Planning a trip to [place] later this month.      Past Medical History:   Diagnosis Date   • Abdominal aneurysm (CMS/HCC)    • Heart disease    • Heart murmur    • Hypertension      Past Surgical History:   Procedure Laterality Date   • ABDOMINAL AORTIC ANEURYSM REPAIR  2009   • CHOLECYSTECTOMY     • CORONARY ANGIOPLASTY WITH STENT PLACEMENT  2012   • PROSTATE BIOPSY  2014       No Known Allergies    Family History and Social History reviewed and changed as necessary      REVIEW OF SYSTEM:   Review of Systems   Constitutional: Negative for appetite change, chills, diaphoresis, fatigue, fever and unexpected weight change.   Respiratory: Negative for cough, hemoptysis and shortness of breath.    Cardiovascular: Negative for chest pain, leg swelling and palpitations.   Gastrointestinal: Positive for occasional diarrhea.   Genitourinary: Positive for chronic urinary frequency particularly nocturnal.    Skin: Negative for rash.   Neurological: Negative for dizziness, gait problem, headaches, light-headedness and numbness.    Psychiatric/Behavioral: Negative for depression. The patient is not nervous/anxious.    All other systems reviewed and are negative.       PHYSICAL EXAM    Vitals:    01/06/21 1037   BP: 143/62   Pulse: 64   Resp: 18   Temp: 97.9 °F (36.6 °C)   Weight: 74.8 kg (165 lb)   Height: 172.7 cm (68\")     Vitals:    01/06/21 1037   PainSc: 0-No pain        Constitutional: Appears well-developed and well-nourished. No distress.   ECOG: (0) Fully Active - Able to Carry On All Pre-disease Performance Without Restriction  HENT:   Head: Normocephalic.   Mouth/Throat: Oropharynx is clear and moist.   Eyes: Conjunctivae are normal. Pupils are equal, round, and reactive to light. No scleral icterus.   Neck: Neck supple. No JVD present.   Cardiovascular: Normal rate, regular rhythm and normal heart sounds.    Pulmonary/Chest: Breath sounds normal. No respiratory distress.  "   Musculoskeletal:Exhibits no edema, tenderness or deformity.   Neurological: Alert and oriented to person, place, and time. Exhibits normal muscle tone.   Skin: No ecchymosis, no petechiae and no rash noted. Not diaphoretic. No cyanosis. Nails show no clubbing.   Psychiatric: Normal mood and affect.   Vitals reviewed.  Labs reviewed.    Lab Results   Component Value Date    HGB 12.7 (L) 12/29/2020    HCT 39.0 12/29/2020    MCV 94.7 12/29/2020     12/29/2020    WBC 12.10 (H) 12/29/2020    NEUTROABS 7.91 (H) 12/29/2020    LYMPHSABS 3.04 12/29/2020    MONOSABS 0.95 (H) 12/29/2020    EOSABS 0.11 12/29/2020    BASOSABS 0.07 12/29/2020       Lab Results   Component Value Date    BUN 27 (H) 12/29/2020    CREATININE 1.05 12/29/2020     12/29/2020    K 4.2 12/29/2020     12/29/2020    CO2 23.2 12/29/2020    CALCIUM 8.5 (L) 12/29/2020    ALBUMIN 4.30 12/29/2020    BILITOT 0.9 12/29/2020    ALKPHOS 80 12/29/2020    AST 18 12/29/2020    ALT 24 12/29/2020           ASSESSMENT & PLAN:    1.  Stage IVb prostate cancer  2.  Urge incontinence with nocturia without improvement on Flomax, Ditropan or Myrbetriq  3.  Intermittent diarrhea    Discussion: The patient is doing well, tolerating therapy with Zytiga and prednisone with no significant side effects, he also received his first Zometa (every 12 weeks) on 12/22/2020 without complication.  Occasional diarrhea but manageable and has not required any intervention.  I reviewed labs from 12/29/2020 with CBC and CMP that were unremarkable.  We will continue therapy unchanged with Zytiga and prednisone, also will continue Zometa every 12 weeks, next is due in March.  He receives Lupron with Dr. Bass, he has a follow-up with Dr. Bass next week at which time he states his PSA will be checked.  We will repeat labs again later this month, he then plans a trip to [place].  We will see him back for follow-up when he returns from [place] the week of February 16 and  will plan on repeating labs again at that time along with a follow-up visit.    This is a level 4, Moderate visit with 1 stable chronic illness, review of results from 2 lab results and prescription drug management.    Kelsi Peter, APRN    01/06/2021

## 2021-01-29 NOTE — PLAN OF CARE
Specialty Pharmacy Assessment    Abiraterone (Zytiga)  Dose: 1000 mg  Frequency: Once daily  Indication: Prostate cancer  Follow-Up: Yes  Dispensing pharmacy: [facility]  Refill status: Current  Dispense status:   Side effect assessment: No or Minimal Side Effects  Intervention: No  Compliance: Patient reports taking capsules whole once daily, Patient reports taking around the same time every day  Additional notes: Labs completed 1/28/21. Patient is leaving for [place] tomorrow, informed him we could not mail to an out of state address. Patient is requesting a refill from [facility]. He will plan to  in person today.

## 2021-02-24 NOTE — TELEPHONE ENCOUNTER
Patient called he got his fist vaccine on 2/14/21 and got exposed to COVID on 2/22/21 by his daughter, and he found out today she is positive. He has an appointment with Kelsi on the 25th should he keep this appointment? Please call to discuss.

## 2021-02-24 NOTE — TELEPHONE ENCOUNTER
Notified patient that appt would be switched to a Mychart video visit and remain at the same time.  He verbalized understanding.

## 2021-02-25 PROBLEM — C79.51 BONE METASTASIS: Status: ACTIVE | Noted: 2021-01-01

## 2021-02-25 NOTE — PROGRESS NOTES
This was an audio and video enabled telemedicine encounter via [employer] due to current COVID-19 pandemic and also patient's possible recent exposure to Covid.      CHIEF COMPLAINT: 1.  Recent potential Covid exposure   2.  Prostate cancer    Problem List:  Oncology/Hematology History Overview Note   1.  Stage IVb prostate cancer [place] 9 adenocarcinoma with positive bone scan 2014 on Lupron with undetectable PSA.  With urge incontinence, no improvement with Vesicare Myrbetriq, both Flomax.    2.  Abdominal aortic aneurysm endovascular surgery 2009 Dr. Hong  3.  Coronary artery disease with history of CABG 2001 and stent placement  4.  History of cholecystectomy and appendectomy  5.  Hypertension  6.  Urge incontinence with nocturia without improvement on Flomax, Ditropan, or Myrbetriq      -1/6/2014 TRUS core needle biopsies showed adenocarcinoma right lobe of the prostate [place]inton's 5+4 with perineural invasion, 3 out of 6 cores involved with adenocarcinoma poorly differentiated.  Left lobe without malignancy.  PSA 41.7.  Clinical stage T2b.  Not considered to be surgical candidate.  CT and bone scan obtained by Dr. Bass.  Started on Casodex with Lupron to follow to avoid surgery and subsequent Lupron.  -11/12/2020 total body bone scan compared to 1/13/2014 bone scan shows uptake in the thoracic spine medial right clavicle similar to prior studies.  Previously identified right proximal humerus and left 10th rib abnormalities resolved.  CT abdomen pelvis with and without contrast compared to CT 6/23/2015 showed the lung bases clear.  Liver normal.  Scattered coarse granulomas of the spleen.  Kidney stone 5 mm distal right ureter with no hydronephrosis.  Evidence of endovascular aortic repair.  Focal aneurysm right common iliac 3 cm slightly increased compared to 2015.  No clear-cut suspicious metastases on bone windows or of nodes for solid organs.  -PSA went from undetectable to 0.04, 0.17 as of 4/16/2019,  then 0.32 as of 10/7/2019 then 0.89 as of 3/24/2020 then 2.26 as of 9/15/2020 with a less than 6-month doubling time.  Per Dr. Bass note, he had bone metastases on bone scan at diagnosis when Lupron was started.  Placed on Casodex in addition daily continuation of Lupron yet with a rise in PSA to 9.96 as of 10/29/2020.  -12/1/2020 initial [facility] medical oncology consultation: With rising PSA, evidence of metastasis on bone scan albeit improved on long interval bone scan compared to prior bone scan 6 years apart and no visceral or kendra metastases, with a PSA over 9 I think it is reasonable with stage IVb disease to treat him, in addition to the Lupron with Dr. Bass, to discontinue Casodex and to give Zytiga with prednisone.  He does not meet criteria for chemotherapeutic intervention given the relatively few bone metastases visible and lack of kendra or visceral involvement.  All patients with high-grade metastatic prostate cancer need genetic testing not just for their sake but for guidance of the family per NCCN guidelines and occasionally, genetic drivers for malignancy can be targets for subsequent treatment.  We could also give him Zometa every 3 months which may reduce the risk of fractures etc. from bone involvement as well as mitigate against osteoporotic fractures with chemical castration.    -12/2/2020 began abiraterone and prednisone  -PSA 12/1/2020 24.9  -PSA 1/28/2021 8.5  -PSA 2/23/2021 8.660     Prostate cancer (CMS/[facility])   12/1/2020 Initial Diagnosis    Prostate cancer (CMS/[facility])     12/1/2020 Cancer Staged    Staging form: Prostate, AJCC 8th Edition  - Clinical: Stage IVB (cT2b, cN0, cM1b, PSA: 41.7, Grade Group: 5) - Signed by Terrence Ramey MD on 12/1/2020 12/2/2020 -  Chemotherapy    OP PROSTATE Abiraterone / PredniSONE     12/22/2020 -  Chemotherapy    OP SUPPORTIVE Zoledronic Acid Q12W         HISTORY OF PRESENT ILLNESS:  The patient is a 85 y.o. male, here for follow up on  management of stage IVb prostate cancer.  The patient continues to tolerate therapy with Zytiga and prednisone, he has been on for about 2 months now with no significant side effects.  Continues on Lupron with Dr. Bass.  He had his trip to [place] with no complications.  He was with his daughter last week and reports that she tested positive for Covid a few days later so he potentially had exposure to Covid.  Currently he is asymptomatic, denies any loss of sense of taste or smell, appetite is good.  He has had no fevers, no cough or shortness of breath.  He is quarantined at home to be safe.  He did receive his first Covid vaccine on February 14 and is scheduled for his second vaccination in March.  He reports he had to go to [facility] to get the vaccination.  Continues to have urinary frequency at night, this is a chronic problem, he has tried several medications previously without relief therefore he stopped taking them.    Past Medical History:   Diagnosis Date   • Abdominal aneurysm (CMS/HCC)    • Heart disease    • Heart murmur    • Hypertension      Past Surgical History:   Procedure Laterality Date   • ABDOMINAL AORTIC ANEURYSM REPAIR  2009   • CHOLECYSTECTOMY     • CORONARY ANGIOPLASTY WITH STENT PLACEMENT  2012   • PROSTATE BIOPSY  2014       No Known Allergies    Family History and Social History reviewed and changed as necessary      REVIEW OF SYSTEM:   Positive for urinary frequency at night, chronic.    PHYSICAL EXAM    Constitutional: Appears well-developed and well-nourished. No distress.   ECOG: (0) Fully Active - Able to Carry On All Pre-disease Performance Without Restriction  HENT:   Head: Normocephalic.   Mouth/Throat: Oropharynx is clear and moist.   Eyes: Conjunctivae are normal. Pupils are equal, round, and reactive to light. No scleral icterus.   Neck: Neck supple. No JVD present.   Cardiovascular: Normal rate, regular rhythm and normal heart sounds.    Pulmonary/Chest: Breath  sounds normal. No respiratory distress.    Musculoskeletal:Exhibits no edema, tenderness or deformity.   Neurological: Alert and oriented to person, place, and time. Exhibits normal muscle tone.   Skin: No ecchymosis, no petechiae and no rash noted. Not diaphoretic. No cyanosis. Nails show no clubbing.   Psychiatric: Normal mood and affect.   Vitals reviewed.  Labs reviewed.    Lab Results   Component Value Date    HGB 12.6 (L) 02/23/2021    HCT 37.4 (L) 02/23/2021    MCV 92.6 02/23/2021     02/23/2021    WBC 13.57 (H) 02/23/2021    NEUTROABS 11.23 (H) 02/23/2021    LYMPHSABS 1.36 02/23/2021    MONOSABS 0.78 02/23/2021    EOSABS 0.06 02/23/2021    BASOSABS 0.06 02/23/2021       Lab Results   Component Value Date    BUN 22 02/23/2021    CREATININE 0.94 02/23/2021     02/23/2021    K 4.3 02/23/2021     02/23/2021    CO2 22.9 02/23/2021    CALCIUM 9.1 02/23/2021    ALBUMIN 4.20 02/23/2021    BILITOT 1.0 02/23/2021    ALKPHOS 65 02/23/2021    AST 25 02/23/2021    ALT 27 02/23/2021     PSA 2/23/2021 8.660    ASSESSMENT & PLAN:    1.  Stage IVb prostate cancer  2.  Urge incontinence with nocturia without improvement on Flomax, Ditropan or Myrbetriq  3.  Recent potential exposure to Covid    Discussion: The patient is doing well, tolerating therapy with Zytiga and prednisone with no significant side effects, he also is on Zometa (every 12 weeks), next due in March. I reviewed labs from 2/23/2021 with CBC and CMP that were unremarkable, PSA was up slightly at 8.660.  We will continue therapy unchanged with Zytiga and prednisone, also will continue Zometa every 12 weeks, next is due in March.  He receives Lupron with Dr. Bass.  I am concerned that his PSA is not decreasing on therapy with Zytiga and prednisone.  We will repeat his restaging scans in March with CT chest, abdomen and pelvis and total body bone scan and I have ordered those today along with repeat PSA.  He recently was with his daughter  who tested positive for Covid, states they had dinner together.  He is quarantining at home however he is asymptomatic.  He has received his first Covid vaccine on February 14 and is scheduled for a second vaccine in March.    This is a level 4 moderate [facility] visit with 1 or more chronic illnesses, review of laboratory data, ordering of upcoming labs and restaging CT and bone scans, management of drug therapy requiring intensive monitoring for toxicity.  Total time of visit today was 30 minutes including reviewing laboratory data, preparing for visit, review of records and ordering upcoming labs along with documented in chart.    Kelsi Peter, APRN    02/25/2021

## 2021-03-03 NOTE — PLAN OF CARE
Specialty Pharmacy Assessment    Abiraterone (Zytiga)  Dose: 1000 mg  Frequency: Once daily  Indication: Prostate cancer  Follow-Up: Yes  Dispensing pharmacy: [facility]  Refill status: Current  Dispense status: Mail  Side effect assessment: No or Minimal Side Effects  Intervention: No  Compliance: Patient reports taking capsules whole once daily, Patient reports taking around the same time every day  Additional notes: Contacted Mr. Gorman for refill and toxicity assessment. Patient confirmed adherence to Abiraterone 1000mg (take 4 tablets by mouth once daily) and Prednisone 5mg (take 1 tablet by mouth twice daily). Patient denied experiencing any side effects. Denied nausea, vomiting, constipation, and diarrhea. Patient stated he has plenty of his prednisone at home as he fills it locally but would like to request a refill of Abiraterone 1000mg from [facility] to be delivered to the confirmed address of: [address] [place] [address]. Will notify [facility] pharmacy of refill request. Patient has [employer] insurance but stated he had the medication delivered last fill and would like it delivered to him again for this fill as he has all of his doctor appointments in [place], [place]. Patient had labs drawn on 2/25/2021 and has a doctor appointment on 3/10/2021 in [place]. Answered all of the patients questions and addressed any concerns.        Medication Adherence    Any gaps in refill history greater than 2 weeks in the last 3 months: no  Demonstrates understanding of importance of adherence: yes  Informant: patient  Reliability of informant: reliable  Estimated medication adherence level: %  Reasons for non-adherence: no problems identified        Drug Interactions    Drug interactions evaluated: yes  Clinically relevant drug interactions identified: no  Provided the patient with educational material regarding drug interactions: not applicable        Patient Counseling    Counseled  the patient on the following: doses and administration discussed, safe handling, storage, and disposal discussed, possible adverse effects and management discussed, possible drug and prescription drug interactions discussed, possible drug and OTC drug and food interactions discussed, lab monitoring and follow-up discussed, cost of medications and cost implications discussed, adherence and missed doses discussed, pharmacy contact information discussed        Adverse Effects    Constipation: Neg  Diarrhea: Neg  Nausea: Neg  Vomiting: Neg        Discussed aforementioned material with patient via phone. Answered all of the patients questions and addressed any concerns. Provided the patient with the pharmacists contact information and instructions to call should additional questions arise.     Thank you,    Sarah Noe  PharmD Candidate 2021  3/3/2021  09:57 EST

## 2021-03-11 NOTE — TELEPHONE ENCOUNTER
tt Evelyne - our order only shows with contrast not with and without. Told her possibly another md ordered with out contrast. She will recheck the order and call me back directly if it is not correct.

## 2021-03-11 NOTE — TELEPHONE ENCOUNTER
[facility] is coming this morning to have CT scan. The order is for CT abdomen, pelvis and chest with contrast and without. There radiology is requesting to with contrast only. He think this may be to much radiation for patient. Please call and she can change there order.

## 2021-03-16 NOTE — PROGRESS NOTES
CHIEF COMPLAINT: Follow-up stage IVb prostate cancer    Problem List:  Oncology/Hematology History Overview Note   1.  Stage IVb prostate cancer [place] 9 adenocarcinoma with positive bone scan 2014 on Lupron with undetectable PSA.  With urge incontinence, no improvement with Vesicare Myrbetriq, both Flomax.    2.  Abdominal aortic aneurysm endovascular surgery 2009 Dr. Hong  3.  Coronary artery disease with history of CABG 2001 and stent placement  4.  History of cholecystectomy and appendectomy  5.  Hypertension  6.  Urge incontinence with nocturia without improvement on Flomax, Ditropan, or Myrbetriq      -1/6/2014 TRUS core needle biopsies showed adenocarcinoma right lobe of the prostate [place] 5+4 with perineural invasion, 3 out of 6 cores involved with adenocarcinoma poorly differentiated.  Left lobe without malignancy.  PSA 41.7.  Clinical stage T2b.  Not considered to be surgical candidate.  CT and bone scan obtained by Dr. Bass.  Started on Casodex with Lupron to follow to avoid surgery and subsequent Lupron.  -11/12/2020 total body bone scan compared to 1/13/2014 bone scan shows uptake in the thoracic spine medial right clavicle similar to prior studies.  Previously identified right proximal humerus and left 10th rib abnormalities resolved.  CT abdomen pelvis with and without contrast compared to CT 6/23/2015 showed the lung bases clear.  Liver normal.  Scattered coarse granulomas of the spleen.  Kidney stone 5 mm distal right ureter with no hydronephrosis.  Evidence of endovascular aortic repair.  Focal aneurysm right common iliac 3 cm slightly increased compared to 2015.  No clear-cut suspicious metastases on bone windows or of nodes for solid organs.  -PSA went from undetectable to 0.04, 0.17 as of 4/16/2019, then 0.32 as of 10/7/2019 then 0.89 as of 3/24/2020 then 2.26 as of 9/15/2020 with a less than 6-month doubling time.  Per Dr. Bass note, he had bone metastases on bone scan at  diagnosis when Lupron was started.  Placed on Casodex in addition daily continuation of Lupron yet with a rise in PSA to 9.96 as of 10/29/2020.  -12/1/2020 initial [facility] consultation: With rising PSA, evidence of metastasis on bone scan albeit improved on long interval bone scan compared to prior bone scan 6 years apart and no visceral or kendra metastases, with a PSA over 9 I think it is reasonable with stage IVb disease to treat him, in addition to the Lupron with Dr. Bass, to discontinue Casodex and to give Zytiga with prednisone.  He does not meet criteria for chemotherapeutic intervention given the relatively few bone metastases visible and lack of kendra or visceral involvement.  All patients with high-grade metastatic prostate cancer need genetic testing not just for their sake but for guidance of the family per NCCN guidelines and occasionally, genetic drivers for malignancy can be targets for subsequent treatment.  We could also give him Zometa every 3 months which may reduce the risk of fractures etc. from bone involvement as well as mitigate against osteoporotic fractures with chemical castration.    -12/2/2020 began abiraterone and prednisone  -PSA 12/1/2020 24.9  -PSA 1/28/2021 8.5  -PSA 2/23/2021 8.660    -3/16/2021 [facility] follow-up visit: PSA 9.8. CMP unremarkable with normal calcium phosphate and magnesium. Hemoglobin 12.8. Creatinine 1.5.3/11/2021 CT chest abdomen pelvis with contrast [facility] shows T1 sclerotic bone lesion with no intrapulmonary metastases and abdominal/pelvic disease progression.  Mural thickening involving the cecum which could be neoplastic versus inflammatory for which colonoscopy is recommended.  Stable endovascular repair with aortoiliac stent grafts staying right, iliac aneurysm.  Total body bone scan negative for metastasis.       Prostate cancer (CMS/HCC)   12/1/2020 Initial Diagnosis    Prostate cancer (CMS/HCC)    "  12/1/2020 Cancer Staged    Staging form: Prostate, AJCC 8th Edition  - Clinical: Stage IVB (cT2b, cN0, cM1b, PSA: 41.7, Grade Group: 5) - Signed by Terrence Ramey MD on 12/1/2020 12/2/2020 -  Chemotherapy    OP PROSTATE Abiraterone / PredniSONE     12/22/2020 -  Chemotherapy    OP SUPPORTIVE Zoledronic Acid Q12W     3/11/2021 Imaging    CT chest abdomen pelvis with contrast [facility] shows T1 sclerotic bone lesion with no intrapulmonary metastases and abdominal/pelvic disease progression.  Mural thickening involving the cecum which could be neoplastic versus inflammatory for which colonoscopy is recommended.  Stable endovascular repair with aortoiliac stent grafts staying right, iliac aneurysm.  Total body bone scan negative for metastasis.         HISTORY OF PRESENT ILLNESS:  The patient is a 85 y.o. male, here for follow up on management of stage IVb prostate cancer    Past Medical History:   Diagnosis Date   • Abdominal aneurysm (CMS/HCC)    • Heart disease    • Heart murmur    • Hypertension      Past Surgical History:   Procedure Laterality Date   • ABDOMINAL AORTIC ANEURYSM REPAIR  2009   • CHOLECYSTECTOMY     • CORONARY ANGIOPLASTY WITH STENT PLACEMENT  2012   • PROSTATE BIOPSY  2014       No Known Allergies    Family History and Social History reviewed and changed as necessary    REVIEW OF SYSTEM:   No bone pain    PHYSICAL EXAM:  Looks quite fit. Lungs clear.  Heart regular rate. Neurologically no focal motor or sensory deficits      Vitals:    03/16/21 1329   BP: 140/75   Pulse: 75   Resp: 20   Temp: 97.7 °F (36.5 °C)   Weight: 75.8 kg (167 lb)   Height: 172.7 cm (68\")     Vitals:    03/16/21 1329   PainSc: 0-No pain          ECOG score: 1     Karnofsky score: 90     Vitals reviewed.    ECOG: (1) Restricted in Physically Strenuous Activity, Ambulatory & Able to Do Work of Light Nature    Lab Results   Component Value Date    HGB 12.6 (L) 02/23/2021    HCT 37.4 (L) 02/23/2021    MCV 92.6 " 02/23/2021     02/23/2021    WBC 13.57 (H) 02/23/2021    NEUTROABS 11.23 (H) 02/23/2021    LYMPHSABS 1.36 02/23/2021    MONOSABS 0.78 02/23/2021    EOSABS 0.06 02/23/2021    BASOSABS 0.06 02/23/2021       Lab Results   Component Value Date    BUN 22 02/23/2021    CREATININE 0.94 02/23/2021     02/23/2021    K 4.3 02/23/2021     02/23/2021    CO2 22.9 02/23/2021    CALCIUM 9.1 02/23/2021    ALBUMIN 4.20 02/23/2021    BILITOT 1.0 02/23/2021    ALKPHOS 65 02/23/2021    AST 25 02/23/2021    ALT 27 02/23/2021             ASSESSMENT & PLAN:  1.  Stage IVb prostate cancer [place] 9 adenocarcinoma with positive bone scan 2014 on Lupron with undetectable PSA.  With urge incontinence, no improvement with Vesicare Myrbetriq, both Flomax.    2.  Abdominal aortic aneurysm endovascular surgery 2009 Dr. Hong  3.  Coronary artery disease with history of CABG 2001 and stent placement  4.  History of cholecystectomy and appendectomy  5.  Hypertension  6.  Urge incontinence with nocturia without improvement on Flomax, Ditropan, or Myrbetriq      -1/6/2014 TRUS core needle biopsies showed adenocarcinoma right lobe of the prostate [place]'s 5+4 with perineural invasion, 3 out of 6 cores involved with adenocarcinoma poorly differentiated.  Left lobe without malignancy.  PSA 41.7.  Clinical stage T2b.  Not considered to be surgical candidate.  CT and bone scan obtained by Dr. Bass.  Started on Casodex with Lupron to follow to avoid surgery and subsequent Lupron.  -11/12/2020 total body bone scan compared to 1/13/2014 bone scan shows uptake in the thoracic spine medial right clavicle similar to prior studies.  Previously identified right proximal humerus and left 10th rib abnormalities resolved.  CT abdomen pelvis with and without contrast compared to CT 6/23/2015 showed the lung bases clear.  Liver normal.  Scattered coarse granulomas of the spleen.  Kidney stone 5 mm distal right ureter with no hydronephrosis.   Evidence of endovascular aortic repair.  Focal aneurysm right common iliac 3 cm slightly increased compared to 2015.  No clear-cut suspicious metastases on bone windows or of nodes for solid organs.  -PSA went from undetectable to 0.04, 0.17 as of 4/16/2019, then 0.32 as of 10/7/2019 then 0.89 as of 3/24/2020 then 2.26 as of 9/15/2020 with a less than 6-month doubling time.  Per Dr. Bass note, he had bone metastases on bone scan at diagnosis when Lupron was started.  Placed on Casodex in addition daily continuation of Lupron yet with a rise in PSA to 9.96 as of 10/29/2020.  -12/1/2020 initial [facility] consultation: With rising PSA, evidence of metastasis on bone scan albeit improved on long interval bone scan compared to prior bone scan 6 years apart and no visceral or kendra metastases, with a PSA over 9 I think it is reasonable with stage IVb disease to treat him, in addition to the Lupron with Dr. Bass, to discontinue Casodex and to give Zytiga with prednisone.  He does not meet criteria for chemotherapeutic intervention given the relatively few bone metastases visible and lack of kendra or visceral involvement.  All patients with high-grade metastatic prostate cancer need genetic testing not just for their sake but for guidance of the family per NCCN guidelines and occasionally, genetic drivers for malignancy can be targets for subsequent treatment.  We could also give him Zometa every 3 months which may reduce the risk of fractures etc. from bone involvement as well as mitigate against osteoporotic fractures with chemical castration.    -12/2/2020 began abiraterone and prednisone  -PSA 12/1/2020 24.9  -PSA 1/28/2021 8.5  -PSA 2/23/2021 8.660    -3/16/2021 [facility] follow-up visit: PSA 9.8. CMP unremarkable with normal calcium phosphate and magnesium. Hemoglobin 12.8. Creatinine 1.5.  3/11/2021 CT chest abdomen pelvis with contrast [facility] shows T1 sclerotic  bone lesion with no intrapulmonary metastases and abdominal/pelvic disease progression.  Mural thickening involving the cecum which could be neoplastic versus inflammatory for which colonoscopy is recommended.  Stable endovascular repair with aortoiliac stent grafts staying right, iliac aneurysm.  Total body bone scan negative for metastasis.  Given that his PSA is staying relatively above on Zytiga and prednisone with the single T1 abnormality on CAT scan not well compared given the changes over at [facility] to [facility] but certainly paucimetastatic. Hence we'll continue the Zytiga, prednisone, every 3 months Zometa, and every 3-month Lupron with Dr. Bass and repeat CAT scans and bone scan in 3 months. With the colonic abnormality on CT we'll get him set up for colonoscopy as colitis will be an unusual complication from his cancer therapy. If we have clear-cut progression of his possible metastatic disease on systemic therapies would not be incredibly aggressive at the age of 85. He'll see my nurse practitioner in the interval to make sure his creatinine is doing well on Zometa.    Total time of care including review of images and reports of images at [facility] and interval laboratory since December as well as going over the overall plan, reinforcing the need to get passion for his routine Lupron which due to world-wide Shortage of 6 months dosage form is now on 3-month dosage (and I mentioned the possibility of switching to oral Relugolix should he desire), took a total of 55 minutes of patient care time today inclusive time spent for here, and after visit getting his appointment set communicating with Dr. Bass and going over the ongoing potential toxicities of therapy and outlining his care as outlined.  Terrence Ramey MD    03/16/2021

## 2021-03-26 NOTE — PLAN OF CARE
"Specialty Pharmacy Assessment    Abiraterone (Zytiga)  Dose: 1000 mg  Frequency: Once daily  Indication: Prostate cancer  Follow-Up: Yes  Dispensing pharmacy: [facility]  Refill status: Refills Submitted  Dispense status: Mail  Side effect assessment: No or Minimal Side Effects  Intervention: No  Compliance: Patient reports taking capsules whole once daily, Patient reports taking around the same time every day  Additional notes: Last labs: 3/16       Medication Adherence    Any gaps in refill history greater than 2 weeks in the last 3 months: no  Demonstrates understanding of importance of adherence: yes  Reliability of informant: reliable  Estimated medication adherence level: %  Reasons for non-adherence: no problems identified  Adherence tools used: watch   Other adherence tools: \"when clock says 7am I take my medicine\"        Adverse Effects    *All other systems reviewed and are negative       Drug Interactions    Drug interactions evaluated: yes  Clinically relevant drug interactions identified: no  Provided the patient with educational material regarding drug interactions: not applicable       Patient Counseling    Counseled the patient on the following: doses and administration discussed, possible adverse effects and management discussed, lab monitoring and follow-up discussed, adherence and missed doses discussed, pharmacy contact information discussed       Reached out to patient for refill assessment/toxicity check. Pt confirms compliance to Abiraterone.Reports tolerating treatment with no new concerns reported during encounter. Requesting refill of Abiraterone to be processed at [facility]. Patient states medication has been being mailed, even though we have that we are unable to deliver. Will alert [facility] to prep for dispense and to contact patient on whether we are able to deliver.     Discussed aforementioned material with patient in clinic/over the phone. All questions and concerns addressed. " Provided patient with my contact information and instructions to call should additional questions arise.    Thank you,  Mirna Noble  PharmD Candidate 2021  3/26/2021 11:43 EDT

## 2021-04-14 NOTE — PROGRESS NOTES
CHIEF COMPLAINT:  1.  CT abdomen with cecal thickening   2.  Stage IVb prostate cancer    Problem List:  Oncology/Hematology History Overview Note   1.  Stage IVb prostate cancer Norris 9 adenocarcinoma with positive bone scan 2014 on Lupron with undetectable PSA.  With urge incontinence, no improvement with Vesicare Myrbetriq, both Flomax.    2.  Abdominal aortic aneurysm endovascular surgery 2009 Dr. Hong  3.  Coronary artery disease with history of CABG 2001 and stent placement  4.  History of cholecystectomy and appendectomy  5.  Hypertension  6.  Urge incontinence with nocturia without improvement on Flomax, Ditropan, or Myrbetriq      -1/6/2014 TRUS core needle biopsies showed adenocarcinoma right lobe of the prostate Norris's 5+4 with perineural invasion, 3 out of 6 cores involved with adenocarcinoma poorly differentiated.  Left lobe without malignancy.  PSA 41.7.  Clinical stage T2b.  Not considered to be surgical candidate.  CT and bone scan obtained by Dr. Bass.  Started on Casodex with Lupron to follow to avoid surgery and subsequent Lupron.  -11/12/2020 total body bone scan compared to 1/13/2014 bone scan shows uptake in the thoracic spine medial right clavicle similar to prior studies.  Previously identified right proximal humerus and left 10th rib abnormalities resolved.  CT abdomen pelvis with and without contrast compared to CT 6/23/2015 showed the lung bases clear.  Liver normal.  Scattered coarse granulomas of the spleen.  Kidney stone 5 mm distal right ureter with no hydronephrosis.  Evidence of endovascular aortic repair.  Focal aneurysm right common iliac 3 cm slightly increased compared to 2015.  No clear-cut suspicious metastases on bone windows or of nodes for solid organs.  -PSA went from undetectable to 0.04, 0.17 as of 4/16/2019, then 0.32 as of 10/7/2019 then 0.89 as of 3/24/2020 then 2.26 as of 9/15/2020 with a less than 6-month doubling time.  Per Dr. Bass note, he had  bone metastases on bone scan at diagnosis when Lupron was started.  Placed on Casodex in addition daily continuation of Lupron yet with a rise in PSA to 9.96 as of 10/29/2020.  -12/1/2020 initial [facility] consultation: With rising PSA, evidence of metastasis on bone scan albeit improved on long interval bone scan compared to prior bone scan 6 years apart and no visceral or kendra metastases, with a PSA over 9 I think it is reasonable with stage IVb disease to treat him, in addition to the Lupron with Dr. Bass, to discontinue Casodex and to give Zytiga with prednisone.  He does not meet criteria for chemotherapeutic intervention given the relatively few bone metastases visible and lack of kendra or visceral involvement.  All patients with high-grade metastatic prostate cancer need genetic testing not just for their sake but for guidance of the family per NCCN guidelines and occasionally, genetic drivers for malignancy can be targets for subsequent treatment.  We could also give him Zometa every 3 months which may reduce the risk of fractures etc. from bone involvement as well as mitigate against osteoporotic fractures with chemical castration.    -12/2/2020 began abiraterone and prednisone  -PSA 12/1/2020 24.9  -PSA 1/28/2021 8.5  -PSA 2/23/2021 8.660    -3/16/2021 [facility] follow-up visit: PSA 9.8. CMP unremarkable with normal calcium phosphate and magnesium. Hemoglobin 12.8. Creatinine 1.5.  3/11/2021 CT chest abdomen pelvis with contrast [facility] shows T1 sclerotic bone lesion with no intrapulmonary metastases and abdominal/pelvic disease progression.  Mural thickening involving the cecum which could be neoplastic versus inflammatory for which colonoscopy is recommended.  Stable endovascular repair with aortoiliac stent grafts staying right, iliac aneurysm.  Total body bone scan negative for metastasis.  Given that his PSA is staying relatively above on Zytiga and  prednisone with the single T1 abnormality on CAT scan not well compared given the changes over at [facility] to [facility] but certainly paucimetastatic. Hence we'll continue the Zytiga, prednisone, every 3 months Zometa, and every 3-month Lupron with Dr. Bass and repeat CAT scans and bone scan in 3 months. With the colonic abnormality on CT we'll get him set up for colonoscopy as colitis will be an unusual complication from his cancer therapy. If we have clear-cut progression of his possible metastatic disease on systemic therapies would not be incredibly aggressive at the age of 85. He'll see my nurse practitioner in the interval to make sure his creatinine is doing well on Zometa.       Prostate cancer (CMS/[facility])   12/1/2020 Initial Diagnosis    Prostate cancer (CMS/[facility])     12/1/2020 Cancer Staged    Staging form: Prostate, AJCC 8th Edition  - Clinical: Stage IVB (cT2b, cN0, cM1b, PSA: 41.7, Grade Group: 5) - Signed by Terrence Ramey MD on 12/1/2020 12/2/2020 -  Chemotherapy    OP PROSTATE Abiraterone / PredniSONE     12/22/2020 -  Chemotherapy    OP SUPPORTIVE Zoledronic Acid Q12W     3/11/2021 Imaging    CT chest abdomen pelvis with contrast [facility] shows T1 sclerotic bone lesion with no intrapulmonary metastases and abdominal/pelvic disease progression.  Mural thickening involving the cecum which could be neoplastic versus inflammatory for which colonoscopy is recommended.  Stable endovascular repair with aortoiliac stent grafts staying right, iliac aneurysm.  Total body bone scan negative for metastasis.         HISTORY OF PRESENT ILLNESS:  The patient is a 85 y.o. male, here for follow up on management of stage IVb prostate cancer.  The patient overall is doing well, continues to tolerate therapy with Zytiga and prednisone.  Up-to-date on Lupron injection, states that he had that recently and was able to get the every 6-month injection as it was back in supply.  Appetite  "is normal, no change in his bowel or bladder habits.  States that he was diagnosed years ago with gastroparesis and was told to eat small frequent meals.  He does have \"indigestion\" if he eats too much at 1 time, usually is relieved by taking Tums.  He is concerned as his wife has been diagnosed recently with anemia after noting black stools, she is scheduled for colonoscopy early May.  Reports his last colonoscopy was probably greater than 10 years ago.    Past Medical History:   Diagnosis Date   • Abdominal aneurysm (CMS/HCC)    • Heart disease    • Heart murmur    • Hypertension      Past Surgical History:   Procedure Laterality Date   • ABDOMINAL AORTIC ANEURYSM REPAIR  2009   • CHOLECYSTECTOMY     • CORONARY ANGIOPLASTY WITH STENT PLACEMENT  2012   • PROSTATE BIOPSY  2014       No Known Allergies    Family History and Social History reviewed and changed as necessary    REVIEW OF SYSTEM:   No bone pain  Positive for occasional indigestion    PHYSICAL EXAM:  Looks quite fit. Lungs clear.  Heart regular rate. Neurologically no focal motor or sensory deficits  Abdomen is soft and nontender, nondistended.    Vitals:    04/14/21 1259   BP: 143/56   Pulse: 64   Resp: 18   Temp: 97.1 °F (36.2 °C)   Weight: 76.2 kg (168 lb)   Height: 172.7 cm (68\")     Vitals:    04/14/21 1259   PainSc: 0-No pain         Vitals reviewed.    ECOG: (1) Restricted in Physically Strenuous Activity, Ambulatory & Able to Do Work of Light Nature    Lab Results   Component Value Date    HGB 12.6 (L) 02/23/2021    HCT 37.4 (L) 02/23/2021    MCV 92.6 02/23/2021     02/23/2021    WBC 13.57 (H) 02/23/2021    NEUTROABS 11.23 (H) 02/23/2021    LYMPHSABS 1.36 02/23/2021    MONOSABS 0.78 02/23/2021    EOSABS 0.06 02/23/2021    BASOSABS 0.06 02/23/2021       Lab Results   Component Value Date    BUN 22 02/23/2021    CREATININE 0.94 02/23/2021     02/23/2021    K 4.3 02/23/2021     02/23/2021    CO2 22.9 02/23/2021    CALCIUM 9.1 " 02/23/2021    ALBUMIN 4.20 02/23/2021    BILITOT 1.0 02/23/2021    ALKPHOS 65 02/23/2021    AST 25 02/23/2021    ALT 27 02/23/2021             ASSESSMENT & PLAN:  1.  Stage IVb prostate cancer Karel 9 adenocarcinoma with positive bone scan 2014 on Lupron with undetectable PSA.  With urge incontinence, no improvement with Vesicare Myrbetriq, both Flomax.    2.  Abdominal aortic aneurysm endovascular surgery 2009 Dr. Hong  3.  Coronary artery disease with history of CABG 2001 and stent placement  4.  History of cholecystectomy and appendectomy  5.  Hypertension  6.  Urge incontinence with nocturia without improvement on Flomax, Ditropan, or Myrbetriq      -1/6/2014 TRUS core needle biopsies showed adenocarcinoma right lobe of the prostate Karel's 5+4 with perineural invasion, 3 out of 6 cores involved with adenocarcinoma poorly differentiated.  Left lobe without malignancy.  PSA 41.7.  Clinical stage T2b.  Not considered to be surgical candidate.  CT and bone scan obtained by Dr. Bass.  Started on Casodex with Lupron to follow to avoid surgery and subsequent Lupron.  -11/12/2020 total body bone scan compared to 1/13/2014 bone scan shows uptake in the thoracic spine medial right clavicle similar to prior studies.  Previously identified right proximal humerus and left 10th rib abnormalities resolved.  CT abdomen pelvis with and without contrast compared to CT 6/23/2015 showed the lung bases clear.  Liver normal.  Scattered coarse granulomas of the spleen.  Kidney stone 5 mm distal right ureter with no hydronephrosis.  Evidence of endovascular aortic repair.  Focal aneurysm right common iliac 3 cm slightly increased compared to 2015.  No clear-cut suspicious metastases on bone windows or of nodes for solid organs.  -PSA went from undetectable to 0.04, 0.17 as of 4/16/2019, then 0.32 as of 10/7/2019 then 0.89 as of 3/24/2020 then 2.26 as of 9/15/2020 with a less than 6-month doubling time.  Per Dr. Bass  note, he had bone metastases on bone scan at diagnosis when Lupron was started.  Placed on Casodex in addition daily continuation of Lupron yet with a rise in PSA to 9.96 as of 10/29/2020.  -12/1/2020 initial [facility] consultation: With rising PSA, evidence of metastasis on bone scan albeit improved on long interval bone scan compared to prior bone scan 6 years apart and no visceral or kendra metastases, with a PSA over 9 I think it is reasonable with stage IVb disease to treat him, in addition to the Lupron with Dr. Bass, to discontinue Casodex and to give Zytiga with prednisone.  He does not meet criteria for chemotherapeutic intervention given the relatively few bone metastases visible and lack of kendra or visceral involvement.  All patients with high-grade metastatic prostate cancer need genetic testing not just for their sake but for guidance of the family per NCCN guidelines and occasionally, genetic drivers for malignancy can be targets for subsequent treatment.  We could also give him Zometa every 3 months which may reduce the risk of fractures etc. from bone involvement as well as mitigate against osteoporotic fractures with chemical castration.    -12/2/2020 began abiraterone and prednisone  -PSA 12/1/2020 24.9  -PSA 1/28/2021 8.5  -PSA 2/23/2021 8.660  -PSA 3/11/2021 9.8    -3/16/2021 [facility] follow-up visit: PSA 9.8. CMP unremarkable with normal calcium phosphate and magnesium. Hemoglobin 12.8. Creatinine 1.5.  3/11/2021 CT chest abdomen pelvis with contrast [place] [facility] shows T1 sclerotic bone lesion with no intrapulmonary metastases and abdominal/pelvic disease progression.  Mural thickening involving the cecum which could be neoplastic versus inflammatory for which colonoscopy is recommended.  Stable endovascular repair with aortoiliac stent grafts staying right, iliac aneurysm.  Total body bone scan negative for metastasis.  Given that his PSA is staying  relatively above on Zytiga and prednisone with the single T1 abnormality on CAT scan not well compared given the changes over at [facility] to [facility] but certainly paucimetastatic. Hence we'll continue the Zytiga, prednisone, every 3 months Zometa, and every 3-month Lupron with Dr. Bass and repeat CAT scans and bone scan in 3 months. With the colonic abnormality on CT we'll get him set up for colonoscopy as colitis will be an unusual complication from his cancer therapy. If we have clear-cut progression of his possible metastatic disease on systemic therapies would not be incredibly aggressive at the age of 85. He'll see my nurse practitioner in the interval to make sure his creatinine is doing well on Zometa.    -4/14/2021 [facility] clinic visit:   Overall doing well.  I discussed the findings on previous CT scan as the referral did not go through for colonoscopy.  I will get him to Dr. Chen for colonoscopy, referral put in today, they are going to see him on April 20 in the office.  PSA in March 9.8.  We will continue to monitor.  Plan on repeating restaging scans in June.  His creatinine is stable at 1.1.  He is not due Zometa again until June as we are doing it every 12 weeks.  He will continue abiraterone and prednisone unchanged.  He is up-to-date on Lupron injections, had one recently with Dr. Bass and was able to get the every 6-month injection as they had a supply back in stock.    I will see him back in 1 month for follow-up to make sure he has had his colonoscopy.  I will order restaging scans at that time for June.    I spent 30 minutes caring for Toni on this date of service. This time includes time spent by me in the following activities: preparing for the visit, reviewing tests, performing a medically appropriate examination and/or evaluation, counseling and educating the patient/family/caregiver, referring and communicating with other health care  professionals and independently interpreting results and communicating that information with the patient/family/caregiver.     Kelsi Peter, APRN    04/14/2021

## 2021-04-20 NOTE — PROGRESS NOTES
I ran a drug-drug interaction report and did not find any interaction between Prevacid and Zytiga, so I returned the patient's phone call to let him know that he can take them at the same time in the morning. He had no further questions.    Lorena Alex  PharmD Candidate 2021

## 2021-04-22 NOTE — TELEPHONE ENCOUNTER
Discussed with Gillian and Dr. Ramey, patient can continue taking his Zytiga and does not need to hold prior to his colonoscopy. Patient verbalized understanding.

## 2021-04-22 NOTE — TELEPHONE ENCOUNTER
Caller: JACKSON JAIMES    Relationship to patient: SELF    Best call back number: [phone]    PT IS HAVING A COLONOSCOPY ON 05/07/21. PT NEEDS TO KNOW WHETHER HE SHOULD STOP TAKING HIS CHEMO MEDICATION PRIOR TO THE COLONOSCOPY. PLEASE ADVISE.

## 2021-04-28 NOTE — PROGRESS NOTES
Specialty Pharmacy Assessment    Abiraterone (Zytiga)  Dose: 1000 mg  Frequency: Once daily  Indication: Prostate cancer  Follow-Up: Yes  Dispensing pharmacy: [facility]  Refill status: Current  Dispense status: Mail  Side effect assessment: No or Minimal Side Effects  Intervention: No  Compliance: Patient reports taking capsules whole once daily, Patient reports taking around the same time every day  Additional notes: Last labs: 4/14/21    Patient would like her abiraterone mailed to her [address] [place] [address]